# Patient Record
Sex: FEMALE | Race: WHITE | NOT HISPANIC OR LATINO | Employment: OTHER | URBAN - METROPOLITAN AREA
[De-identification: names, ages, dates, MRNs, and addresses within clinical notes are randomized per-mention and may not be internally consistent; named-entity substitution may affect disease eponyms.]

---

## 2017-01-01 ENCOUNTER — OFFICE VISIT (OUTPATIENT)
Dept: ONCOLOGY | Facility: CLINIC | Age: 82
End: 2017-01-01

## 2017-01-01 ENCOUNTER — LAB (OUTPATIENT)
Dept: LAB | Facility: HOSPITAL | Age: 82
End: 2017-01-01

## 2017-01-01 ENCOUNTER — TELEPHONE (OUTPATIENT)
Dept: PULMONOLOGY | Facility: CLINIC | Age: 82
End: 2017-01-01

## 2017-01-01 ENCOUNTER — TELEPHONE (OUTPATIENT)
Dept: ONCOLOGY | Facility: CLINIC | Age: 82
End: 2017-01-01

## 2017-01-01 VITALS
BODY MASS INDEX: 27.9 KG/M2 | RESPIRATION RATE: 16 BRPM | WEIGHT: 138.4 LBS | OXYGEN SATURATION: 90 % | DIASTOLIC BLOOD PRESSURE: 76 MMHG | SYSTOLIC BLOOD PRESSURE: 159 MMHG | HEART RATE: 75 BPM | HEIGHT: 59 IN | TEMPERATURE: 97.6 F

## 2017-01-01 DIAGNOSIS — C82.03 FOLLICULAR LYMPHOMA GRADE I OF INTRA-ABDOMINAL LYMPH NODES (HCC): ICD-10-CM

## 2017-01-01 DIAGNOSIS — C82.04 GRADE 1 FOLLICULAR LYMPHOMA OF LYMPH NODES OF AXILLA (HCC): Primary | ICD-10-CM

## 2017-01-01 DIAGNOSIS — Z85.51 HISTORY OF BLADDER CANCER: ICD-10-CM

## 2017-01-01 LAB
ALBUMIN SERPL-MCNC: 4.1 G/DL (ref 3.2–4.8)
ALBUMIN/GLOB SERPL: 1.3 G/DL (ref 1.5–2.5)
ALP SERPL-CCNC: 105 U/L (ref 25–100)
ALT SERPL W P-5'-P-CCNC: 9 U/L (ref 7–40)
ANION GAP SERPL CALCULATED.3IONS-SCNC: 6 MMOL/L (ref 3–11)
AST SERPL-CCNC: 24 U/L (ref 0–33)
BILIRUB SERPL-MCNC: 0.5 MG/DL (ref 0.3–1.2)
BUN BLD-MCNC: 15 MG/DL (ref 9–23)
BUN/CREAT SERPL: 16.7 (ref 7–25)
CALCIUM SPEC-SCNC: 9.4 MG/DL (ref 8.7–10.4)
CHLORIDE SERPL-SCNC: 105 MMOL/L (ref 99–109)
CO2 SERPL-SCNC: 30 MMOL/L (ref 20–31)
CREAT BLD-MCNC: 0.9 MG/DL (ref 0.6–1.3)
ERYTHROCYTE [DISTWIDTH] IN BLOOD BY AUTOMATED COUNT: 16.8 % (ref 11.3–14.5)
GFR SERPL CREATININE-BSD FRML MDRD: 60 ML/MIN/1.73
GLOBULIN UR ELPH-MCNC: 3.2 GM/DL
GLUCOSE BLD-MCNC: 93 MG/DL (ref 70–100)
HCT VFR BLD AUTO: 34.7 % (ref 34.5–44)
HGB BLD-MCNC: 10.9 G/DL (ref 11.5–15.5)
LYMPHOCYTES # BLD AUTO: 3 10*3/MM3 (ref 0.6–4.8)
LYMPHOCYTES NFR BLD AUTO: 37.9 % (ref 24–44)
MCH RBC QN AUTO: 26.9 PG (ref 27–31)
MCHC RBC AUTO-ENTMCNC: 31.5 G/DL (ref 32–36)
MCV RBC AUTO: 85.4 FL (ref 80–99)
MONOCYTES # BLD AUTO: 0.3 10*3/MM3 (ref 0–1)
MONOCYTES NFR BLD AUTO: 3.6 % (ref 0–12)
NEUTROPHILS # BLD AUTO: 4.7 10*3/MM3 (ref 1.5–8.3)
NEUTROPHILS NFR BLD AUTO: 58.5 % (ref 41–71)
PLATELET # BLD AUTO: 181 10*3/MM3 (ref 150–450)
PMV BLD AUTO: 8.4 FL (ref 6–12)
POTASSIUM BLD-SCNC: 5.3 MMOL/L (ref 3.5–5.5)
PROT SERPL-MCNC: 7.3 G/DL (ref 5.7–8.2)
RBC # BLD AUTO: 4.07 10*6/MM3 (ref 3.89–5.14)
SODIUM BLD-SCNC: 141 MMOL/L (ref 132–146)
WBC NRBC COR # BLD: 8 10*3/MM3 (ref 3.5–10.8)

## 2017-01-01 PROCEDURE — 85025 COMPLETE CBC W/AUTO DIFF WBC: CPT

## 2017-01-01 PROCEDURE — 99213 OFFICE O/P EST LOW 20 MIN: CPT | Performed by: INTERNAL MEDICINE

## 2017-01-01 PROCEDURE — 80053 COMPREHEN METABOLIC PANEL: CPT | Performed by: INTERNAL MEDICINE

## 2017-01-01 PROCEDURE — 36415 COLL VENOUS BLD VENIPUNCTURE: CPT

## 2017-01-01 RX ORDER — PREDNISONE 20 MG/1
40 TABLET ORAL DAILY
Qty: 10 TABLET | Refills: 0 | Status: SHIPPED | OUTPATIENT
Start: 2017-01-01 | End: 2018-01-01

## 2017-01-01 RX ORDER — AZITHROMYCIN 250 MG/1
TABLET, FILM COATED ORAL
Qty: 6 TABLET | Refills: 0 | Status: SHIPPED | OUTPATIENT
Start: 2017-01-01 | End: 2017-01-01

## 2017-01-01 RX ORDER — DOXYCYCLINE HYCLATE 100 MG/1
100 CAPSULE ORAL 2 TIMES DAILY
Qty: 20 CAPSULE | Refills: 0 | Status: SHIPPED | OUTPATIENT
Start: 2017-01-01 | End: 2018-01-01

## 2017-04-05 ENCOUNTER — OFFICE VISIT (OUTPATIENT)
Dept: ONCOLOGY | Facility: CLINIC | Age: 82
End: 2017-04-05

## 2017-04-05 ENCOUNTER — LAB (OUTPATIENT)
Dept: LAB | Facility: HOSPITAL | Age: 82
End: 2017-04-05

## 2017-04-05 ENCOUNTER — OFFICE VISIT (OUTPATIENT)
Dept: PULMONOLOGY | Facility: CLINIC | Age: 82
End: 2017-04-05

## 2017-04-05 VITALS
WEIGHT: 147 LBS | BODY MASS INDEX: 29.64 KG/M2 | OXYGEN SATURATION: 93 % | RESPIRATION RATE: 20 BRPM | SYSTOLIC BLOOD PRESSURE: 106 MMHG | HEART RATE: 90 BPM | TEMPERATURE: 97.6 F | DIASTOLIC BLOOD PRESSURE: 52 MMHG | HEIGHT: 59 IN

## 2017-04-05 DIAGNOSIS — R06.02 SHORTNESS OF BREATH: Primary | ICD-10-CM

## 2017-04-05 DIAGNOSIS — C82.03 FOLLICULAR LYMPHOMA GRADE I OF INTRA-ABDOMINAL LYMPH NODES (HCC): Primary | ICD-10-CM

## 2017-04-05 DIAGNOSIS — C82.03 FOLLICULAR LYMPHOMA GRADE I OF INTRA-ABDOMINAL LYMPH NODES (HCC): ICD-10-CM

## 2017-04-05 LAB
ALBUMIN SERPL-MCNC: 3.9 G/DL (ref 3.2–4.8)
ALBUMIN/GLOB SERPL: 1.3 G/DL (ref 1.5–2.5)
ALP SERPL-CCNC: 100 U/L (ref 25–100)
ALT SERPL W P-5'-P-CCNC: 7 U/L (ref 7–40)
ANION GAP SERPL CALCULATED.3IONS-SCNC: 9 MMOL/L (ref 3–11)
AST SERPL-CCNC: 23 U/L (ref 0–33)
BILIRUB SERPL-MCNC: 0.6 MG/DL (ref 0.3–1.2)
BUN BLD-MCNC: 19 MG/DL (ref 9–23)
BUN/CREAT SERPL: 17.3 (ref 7–25)
CALCIUM SPEC-SCNC: 9.2 MG/DL (ref 8.7–10.4)
CHLORIDE SERPL-SCNC: 99 MMOL/L (ref 99–109)
CO2 SERPL-SCNC: 30 MMOL/L (ref 20–31)
CREAT BLD-MCNC: 1.1 MG/DL (ref 0.6–1.3)
ERYTHROCYTE [DISTWIDTH] IN BLOOD BY AUTOMATED COUNT: 15.4 % (ref 11.3–14.5)
GFR SERPL CREATININE-BSD FRML MDRD: 47 ML/MIN/1.73
GLOBULIN UR ELPH-MCNC: 3.1 GM/DL
GLUCOSE BLD-MCNC: 140 MG/DL (ref 70–100)
HCT VFR BLD AUTO: 32.2 % (ref 34.5–44)
HGB BLD-MCNC: 10.4 G/DL (ref 11.5–15.5)
LYMPHOCYTES # BLD AUTO: 2.7 10*3/MM3 (ref 0.6–4.8)
LYMPHOCYTES NFR BLD AUTO: 35.4 % (ref 24–44)
MCH RBC QN AUTO: 27.7 PG (ref 27–31)
MCHC RBC AUTO-ENTMCNC: 32.4 G/DL (ref 32–36)
MCV RBC AUTO: 85.6 FL (ref 80–99)
MONOCYTES # BLD AUTO: 0.4 10*3/MM3 (ref 0–1)
MONOCYTES NFR BLD AUTO: 5.8 % (ref 0–12)
NEUTROPHILS # BLD AUTO: 4.5 10*3/MM3 (ref 1.5–8.3)
NEUTROPHILS NFR BLD AUTO: 58.8 % (ref 41–71)
PLATELET # BLD AUTO: 215 10*3/MM3 (ref 150–450)
PMV BLD AUTO: 8.7 FL (ref 6–12)
POTASSIUM BLD-SCNC: 4.2 MMOL/L (ref 3.5–5.5)
PROT SERPL-MCNC: 7 G/DL (ref 5.7–8.2)
RBC # BLD AUTO: 3.76 10*6/MM3 (ref 3.89–5.14)
SODIUM BLD-SCNC: 138 MMOL/L (ref 132–146)
WBC NRBC COR # BLD: 7.6 10*3/MM3 (ref 3.5–10.8)

## 2017-04-05 PROCEDURE — 99213 OFFICE O/P EST LOW 20 MIN: CPT | Performed by: INTERNAL MEDICINE

## 2017-04-05 PROCEDURE — 36415 COLL VENOUS BLD VENIPUNCTURE: CPT

## 2017-04-05 PROCEDURE — 85025 COMPLETE CBC W/AUTO DIFF WBC: CPT

## 2017-04-05 PROCEDURE — 94726 PLETHYSMOGRAPHY LUNG VOLUMES: CPT | Performed by: NURSE PRACTITIONER

## 2017-04-05 PROCEDURE — 94375 RESPIRATORY FLOW VOLUME LOOP: CPT | Performed by: NURSE PRACTITIONER

## 2017-04-05 PROCEDURE — 80053 COMPREHEN METABOLIC PANEL: CPT | Performed by: INTERNAL MEDICINE

## 2017-04-05 PROCEDURE — 94200 LUNG FUNCTION TEST (MBC/MVV): CPT | Performed by: NURSE PRACTITIONER

## 2017-04-05 PROCEDURE — 94729 DIFFUSING CAPACITY: CPT | Performed by: NURSE PRACTITIONER

## 2017-04-05 RX ORDER — MONTELUKAST SODIUM 10 MG/1
10 TABLET ORAL NIGHTLY
Qty: 30 TABLET | Refills: 3 | Status: SHIPPED | OUTPATIENT
Start: 2017-04-05

## 2017-04-05 NOTE — PROGRESS NOTES
Chief Complaint   Follow-up low-grade lymphoma-no recent recurrence or progression for greater than 5 years, since treated with single agent Treanda.    PROBLEM LIST   Patient Active Problem List   Diagnosis   • Allergic rhinitis   • Asthma   • COPD (chronic obstructive pulmonary disease) with emphysema   • Coronary artery disease   • Dyslipidemia   • GERD (gastroesophageal reflux disease)   • Hearing loss   • Hypothyroidism   • Non Hodgkin's lymphoma   • Pulmonary fibrosis   • History of bladder cancer       HISTORY OF PRESENT ILLNESS:   This very pleasant 84-year-old lady who had known for greater than 20 years returns for routine follow-up.  She did have a somewhat eventful winter.  She didn't need to be hospitalized but she did develop pneumonia that was treated a couple of months ago.  She's per much back to baseline.  She uses oxygen pretty much continuously.  No recent fevers chills or sweats.  No new abdominal symptoms to speak of she continues to enjoy her wonderful and large family.    Past Medical History, Past Surgical History, Social History, Family History have been reviewed and are without significant changes except as mentioned.      Medications:      Current Outpatient Prescriptions:   •  ciclesonide (ALVESCO) 160 MCG/ACT inhaler, Alvesco 160 MCG/ACT Inhalation Aerosol Solution; Patient Sig: Alvesco 160 MCG/ACT Inhalation Aerosol Solution QD; 0; 23-Sep-2014; Active, Disp: , Rfl:   •  acetaminophen (TYLENOL) 325 MG tablet, Take  by mouth., Disp: , Rfl:   •  aspirin 81 MG chewable tablet, Chew Daily., Disp: , Rfl:   •  atenolol (TENORMIN) 25 MG tablet, Take  by mouth Daily., Disp: , Rfl:   •  atorvastatin (LIPITOR) 40 MG tablet, Take  by mouth Daily., Disp: , Rfl:   •  ezetimibe (ZETIA) 10 MG tablet, Take  by mouth Daily., Disp: , Rfl:   •  furosemide (LASIX) 40 MG tablet, Take  by mouth Daily., Disp: , Rfl:   •  levothyroxine (SYNTHROID, LEVOTHROID) 75 MCG tablet, Take  by mouth Daily., Disp: , Rfl:  "  •  Loratadine (CLARITIN) 10 MG capsule, Take  by mouth., Disp: , Rfl:   •  montelukast (SINGULAIR) 10 MG tablet, Take 1 tablet by mouth Every Night., Disp: 30 tablet, Rfl: 3  •  nitroglycerin (NITROSTAT) 0.4 MG SL tablet, Place  under the tongue., Disp: , Rfl:   •  O2 (OXYGEN), Oxygen; Patient Sig: Oxygen ; 0; 13-Apr-2016; Active, Disp: , Rfl:     ALLERGIES:    Allergies   Allergen Reactions   • Budesonide    • Formoterol    • Penicillins    • Sulfa Antibiotics        ROS:  Review of Systems   Constitutional: Negative for activity change and appetite change.        Age related fatigue present.  Takes a nap most days.   HENT: Negative for mouth sores, sinus pressure and voice change.    Eyes: Negative for visual disturbance.   Respiratory: Negative for shortness of breath.         Chronic dyspnea on exertion slowly progressive over the years   Cardiovascular: Negative for chest pain.   Gastrointestinal: Negative for abdominal pain and vomiting.   Genitourinary: Negative for dysuria.   Musculoskeletal: Negative for arthralgias and myalgias.   Skin: Negative for color change.   Neurological: Negative for dizziness, syncope and headaches.   Hematological: Negative for adenopathy.   Psychiatric/Behavioral: Negative for confusion, sleep disturbance and suicidal ideas. The patient is not nervous/anxious.            Objective:    /52  Pulse 90  Temp 97.6 °F (36.4 °C) (Temporal Artery )   Resp 20  Ht 59\" (149.9 cm)  Wt 147 lb (66.7 kg)  SpO2 93% Comment: 2L o2  BMI 29.69 kg/m2    Physical Exam   Constitutional: She is oriented to person, place, and time. She appears well-developed and well-nourished. No distress.   Alert oriented petite 84-year-old lady in no acute distress   HENT:   Head: Normocephalic.   Mouth/Throat: Oropharynx is clear and moist.   No lymph nodes palpable in the head and neck or anywhere else in the body   Eyes: Conjunctivae and EOM are normal. No scleral icterus.   Neck: Normal range of " motion. Neck supple. No thyromegaly present.   Cardiovascular: Normal rate, regular rhythm and normal heart sounds.    No murmur heard.  Pulmonary/Chest: Effort normal and breath sounds normal. She has no wheezes. She has no rales.   Bilateral fine rales noted, consistent with fibrosis   Abdominal: Soft. Bowel sounds are normal. She exhibits no distension and no mass. There is no tenderness.   Can no longer feel the abdominal mass that she had for years that was a remnant of her prior low-grade lymphoma   Musculoskeletal: She exhibits no edema or tenderness.   Joint findings present in the fingers consistent with osteoarthritis   Lymphadenopathy:     She has no cervical adenopathy.   Neurological: She is alert and oriented to person, place, and time. She displays normal reflexes. No cranial nerve deficit.   Skin: Skin is warm and dry. No rash noted.   Psychiatric: She has a normal mood and affect. Judgment normal.   Vitals reviewed.             RECENT LABS:  Hematology WBC   Date Value Ref Range Status   10/19/2016 7.10 3.50 - 10.80 10*3/mm3 Final     Hemoglobin   Date Value Ref Range Status   10/19/2016 12.2 11.5 - 15.5 g/dL Final     Hematocrit   Date Value Ref Range Status   10/19/2016 37.6 34.5 - 44.0 % Final     MCV   Date Value Ref Range Status   10/19/2016 88.0 80.0 - 99.0 fL Final     RDW   Date Value Ref Range Status   10/19/2016 14.5 11.3 - 14.5 % Final     MPV   Date Value Ref Range Status   10/19/2016 8.3 6.0 - 12.0 fL Final     Platelets   Date Value Ref Range Status   10/19/2016 175 150 - 450 10*3/mm3 Final     Neutrophils, Absolute   Date Value Ref Range Status   10/19/2016 4.10 1.50 - 8.30 10*3/mm3 Final     Lymphocytes, Absolute   Date Value Ref Range Status   10/19/2016 2.80 0.60 - 4.80 10*3/mm3 Final     Monocytes, Absolute   Date Value Ref Range Status   10/19/2016 0.20 0.00 - 1.00 10*3/mm3 Final     Eosinophils Absolute   Date Value Ref Range Status   09/09/2015 0.29 0.10 - 0.30 K/mcL Final      Basophils Absolute   Date Value Ref Range Status   09/09/2015 0.04 0.00 - 0.20 K/mcL Final       Glucose   Date Value Ref Range Status   10/19/2016 128 (H) 70 - 100 mg/dL Final     Sodium   Date Value Ref Range Status   10/19/2016 144 132 - 146 mmol/L Final     Potassium   Date Value Ref Range Status   10/19/2016 4.2 3.5 - 5.5 mmol/L Final     CO2   Date Value Ref Range Status   10/19/2016 31.0 20.0 - 31.0 mmol/L Final     Chloride   Date Value Ref Range Status   10/19/2016 103 99 - 109 mmol/L Final     Anion Gap   Date Value Ref Range Status   10/19/2016 10.0 3.0 - 11.0 mmol/L Final     Creatinine   Date Value Ref Range Status   10/19/2016 1.00 0.60 - 1.30 mg/dL Final     BUN   Date Value Ref Range Status   10/19/2016 16 9 - 23 mg/dL Final     BUN/Creatinine Ratio   Date Value Ref Range Status   10/19/2016 16.0 7.0 - 25.0 Final     Calcium   Date Value Ref Range Status   10/19/2016 9.0 8.7 - 10.4 mg/dL Final     eGFR Non  Amer   Date Value Ref Range Status   10/19/2016 53 (L) >60 mL/min/1.73 Final     Alkaline Phosphatase   Date Value Ref Range Status   10/19/2016 112 (H) 25 - 100 U/L Final     Total Protein   Date Value Ref Range Status   10/19/2016 7.4 5.7 - 8.2 g/dL Final     ALT (SGPT)   Date Value Ref Range Status   10/19/2016 12 7 - 40 U/L Final     AST (SGOT)   Date Value Ref Range Status   10/19/2016 21 0 - 33 U/L Final     Total Bilirubin   Date Value Ref Range Status   10/19/2016 0.6 0.3 - 1.2 mg/dL Final     Albumin   Date Value Ref Range Status   10/19/2016 4.00 3.20 - 4.80 g/dL Final     Globulin   Date Value Ref Range Status   10/19/2016 3.4 gm/dL Final     A/G Ratio   Date Value Ref Range Status   10/19/2016 1.2 g/dL Final          Perf Status: 1    Assessment/Plan    Diagnoses and all orders for this visit:    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  I'm delighted for her.  Plan on seeing her back in about 6 months  or so as I usually do      Car Horton MD , 4/5/2017    CC:

## 2017-04-11 ENCOUNTER — OFFICE VISIT (OUTPATIENT)
Dept: PULMONOLOGY | Facility: CLINIC | Age: 82
End: 2017-04-11

## 2017-04-11 VITALS
WEIGHT: 144.8 LBS | DIASTOLIC BLOOD PRESSURE: 81 MMHG | HEART RATE: 74 BPM | OXYGEN SATURATION: 88 % | SYSTOLIC BLOOD PRESSURE: 141 MMHG | HEIGHT: 59 IN | TEMPERATURE: 97.1 F | RESPIRATION RATE: 18 BRPM | BODY MASS INDEX: 29.19 KG/M2

## 2017-04-11 DIAGNOSIS — J43.9 PULMONARY EMPHYSEMA, UNSPECIFIED EMPHYSEMA TYPE (HCC): ICD-10-CM

## 2017-04-11 DIAGNOSIS — C82.04 GRADE 1 FOLLICULAR LYMPHOMA OF LYMPH NODES OF AXILLA (HCC): ICD-10-CM

## 2017-04-11 DIAGNOSIS — R06.02 SOB (SHORTNESS OF BREATH): Primary | ICD-10-CM

## 2017-04-11 DIAGNOSIS — J84.10 PULMONARY FIBROSIS (HCC): ICD-10-CM

## 2017-04-11 DIAGNOSIS — I25.10 CORONARY ARTERY DISEASE WITHOUT ANGINA PECTORIS, UNSPECIFIED VESSEL OR LESION TYPE, UNSPECIFIED WHETHER NATIVE OR TRANSPLANTED HEART: ICD-10-CM

## 2017-04-11 DIAGNOSIS — K21.9 GASTROESOPHAGEAL REFLUX DISEASE, ESOPHAGITIS PRESENCE NOT SPECIFIED: ICD-10-CM

## 2017-04-11 LAB
ALBUMIN SERPL-MCNC: 4 G/DL (ref 3.2–4.8)
ALBUMIN/GLOB SERPL: 1.3 G/DL (ref 1.5–2.5)
ALP SERPL-CCNC: 103 U/L (ref 25–100)
ALT SERPL W P-5'-P-CCNC: 10 U/L (ref 7–40)
ANION GAP SERPL CALCULATED.3IONS-SCNC: 3 MMOL/L (ref 3–11)
AST SERPL-CCNC: 26 U/L (ref 0–33)
BASOPHILS # BLD AUTO: 0.03 10*3/MM3 (ref 0–0.2)
BASOPHILS NFR BLD AUTO: 0.4 % (ref 0–1)
BILIRUB SERPL-MCNC: 0.7 MG/DL (ref 0.3–1.2)
BNP SERPL-MCNC: 177 PG/ML (ref 0–100)
BUN BLD-MCNC: 13 MG/DL (ref 9–23)
BUN/CREAT SERPL: 14.4 (ref 7–25)
CALCIUM SPEC-SCNC: 9.4 MG/DL (ref 8.7–10.4)
CHLORIDE SERPL-SCNC: 105 MMOL/L (ref 99–109)
CO2 SERPL-SCNC: 31 MMOL/L (ref 20–31)
CREAT BLD-MCNC: 0.9 MG/DL (ref 0.6–1.3)
DEPRECATED RDW RBC AUTO: 47.4 FL (ref 37–54)
EOSINOPHIL # BLD AUTO: 0.26 10*3/MM3 (ref 0.1–0.3)
EOSINOPHIL NFR BLD AUTO: 3.6 % (ref 0–3)
ERYTHROCYTE [DISTWIDTH] IN BLOOD BY AUTOMATED COUNT: 14.3 % (ref 11.3–14.5)
GFR SERPL CREATININE-BSD FRML MDRD: 60 ML/MIN/1.73
GLOBULIN UR ELPH-MCNC: 3.2 GM/DL
GLUCOSE BLD-MCNC: 99 MG/DL (ref 70–100)
HCT VFR BLD AUTO: 35.5 % (ref 34.5–44)
HGB BLD-MCNC: 11.1 G/DL (ref 11.5–15.5)
IMM GRANULOCYTES # BLD: 0.02 10*3/MM3 (ref 0–0.03)
IMM GRANULOCYTES NFR BLD: 0.3 % (ref 0–0.6)
LYMPHOCYTES # BLD AUTO: 2.28 10*3/MM3 (ref 0.6–4.8)
LYMPHOCYTES NFR BLD AUTO: 31.7 % (ref 24–44)
MCH RBC QN AUTO: 28.2 PG (ref 27–31)
MCHC RBC AUTO-ENTMCNC: 31.3 G/DL (ref 32–36)
MCV RBC AUTO: 90.3 FL (ref 80–99)
MONOCYTES # BLD AUTO: 0.67 10*3/MM3 (ref 0–1)
MONOCYTES NFR BLD AUTO: 9.3 % (ref 0–12)
NEUTROPHILS # BLD AUTO: 3.93 10*3/MM3 (ref 1.5–8.3)
NEUTROPHILS NFR BLD AUTO: 54.7 % (ref 41–71)
PLATELET # BLD AUTO: 261 10*3/MM3 (ref 150–450)
PMV BLD AUTO: 10.5 FL (ref 6–12)
POTASSIUM BLD-SCNC: 4.4 MMOL/L (ref 3.5–5.5)
PROT SERPL-MCNC: 7.2 G/DL (ref 5.7–8.2)
RBC # BLD AUTO: 3.93 10*6/MM3 (ref 3.89–5.14)
SODIUM BLD-SCNC: 139 MMOL/L (ref 132–146)
WBC NRBC COR # BLD: 7.19 10*3/MM3 (ref 3.5–10.8)

## 2017-04-11 PROCEDURE — 85025 COMPLETE CBC W/AUTO DIFF WBC: CPT | Performed by: NURSE PRACTITIONER

## 2017-04-11 PROCEDURE — 80053 COMPREHEN METABOLIC PANEL: CPT | Performed by: NURSE PRACTITIONER

## 2017-04-11 PROCEDURE — 99214 OFFICE O/P EST MOD 30 MIN: CPT | Performed by: NURSE PRACTITIONER

## 2017-04-11 PROCEDURE — 83880 ASSAY OF NATRIURETIC PEPTIDE: CPT | Performed by: NURSE PRACTITIONER

## 2017-04-11 RX ORDER — MONTELUKAST SODIUM 10 MG/1
TABLET ORAL
Qty: 90 TABLET | Refills: 3 | Status: ON HOLD | OUTPATIENT
Start: 2017-04-11 | End: 2018-01-01

## 2017-10-04 NOTE — PROGRESS NOTES
Chief Complaint   Follow-up low-grade lymphoma.  History of bladder cancer.    PROBLEM LIST   Patient Active Problem List   Diagnosis   • Allergic rhinitis   • Asthma   • COPD (chronic obstructive pulmonary disease) with emphysema   • Coronary artery disease   • Dyslipidemia   • GERD (gastroesophageal reflux disease)   • Hearing loss   • Hypothyroidism   • Non Hodgkin's lymphoma   • Pulmonary fibrosis   • History of bladder cancer       HISTORY OF PRESENT ILLNESS:   Six-month follow-up for this now 85-year-old lady.  She had a good summer.  Her weight is been steady.  Her appetite is good.  She breathing comfortably.  She's had absolutely no abdominal pain.  She's had no dysuria or gross hematuria    Past Medical History, Past Surgical History, Social History, Family History have been reviewed and are without significant changes except as mentioned.      Medications:      Current Outpatient Prescriptions:   •  acetaminophen (TYLENOL) 325 MG tablet, Take  by mouth., Disp: , Rfl:   •  aspirin 81 MG chewable tablet, Chew Daily., Disp: , Rfl:   •  atenolol (TENORMIN) 25 MG tablet, Take  by mouth Daily., Disp: , Rfl:   •  atorvastatin (LIPITOR) 40 MG tablet, Take  by mouth Daily., Disp: , Rfl:   •  ciclesonide (ALVESCO) 160 MCG/ACT inhaler, Alvesco 160 MCG/ACT Inhalation Aerosol Solution; Patient Sig: Alvesco 160 MCG/ACT Inhalation Aerosol Solution QD; 0; 23-Sep-2014; Active, Disp: , Rfl:   •  ezetimibe (ZETIA) 10 MG tablet, Take  by mouth Daily., Disp: , Rfl:   •  levothyroxine (SYNTHROID, LEVOTHROID) 75 MCG tablet, Take  by mouth Daily., Disp: , Rfl:   •  Loratadine (CLARITIN) 10 MG capsule, Take  by mouth., Disp: , Rfl:   •  montelukast (SINGULAIR) 10 MG tablet, Take 1 tablet by mouth Every Night., Disp: 30 tablet, Rfl: 3  •  montelukast (SINGULAIR) 10 MG tablet, Take 1 each night, Disp: 90 tablet, Rfl: 3  •  nitroglycerin (NITROSTAT) 0.4 MG SL tablet, Place  under the tongue., Disp: , Rfl:   •  O2 (OXYGEN), Oxygen;  "Patient Sig: Oxygen ; 0; 13-Apr-2016; Active, Disp: , Rfl:   •  furosemide (LASIX) 40 MG tablet, Take  by mouth Daily., Disp: , Rfl:   •  predniSONE (DELTASONE) 20 MG tablet, Take 2 tablets by mouth Daily., Disp: 10 tablet, Rfl: 0    ALLERGIES:    Allergies   Allergen Reactions   • Budesonide    • Formoterol    • Penicillins    • Sulfa Antibiotics        ROS:  Review of Systems   Constitutional: Negative for activity change and appetite change.   HENT: Negative for mouth sores, sinus pressure and voice change.    Eyes: Negative for visual disturbance.   Respiratory: Negative for shortness of breath.         Chronic dyspnea and need for continuous oxygen unchanged   Cardiovascular: Negative for chest pain.   Gastrointestinal: Negative for abdominal pain and vomiting.   Genitourinary: Negative for dysuria.   Musculoskeletal: Negative for arthralgias and myalgias.        Osteoarthritic symptoms at baseline   Skin: Negative for color change.   Neurological: Negative for dizziness, syncope and headaches.   Hematological: Negative for adenopathy.   Psychiatric/Behavioral: Negative for confusion, sleep disturbance and suicidal ideas. The patient is not nervous/anxious.          Objective:    /76  Pulse 75  Temp 97.6 °F (36.4 °C) (Temporal Artery )   Resp 16  Ht 59\" (149.9 cm)  Wt 138 lb 6.4 oz (62.8 kg)  SpO2 90% Comment: 2L O2  BMI 27.95 kg/m2    Physical Exam   Constitutional: She is oriented to person, place, and time. She appears well-developed and well-nourished. No distress.   Smiles readily.  She is awake and alert.  She has nasal cannula oxygen applied.  She has excellent insight.   HENT:   Head: Normocephalic.   Mouth/Throat: Oropharynx is clear and moist.   Eyes: Conjunctivae and EOM are normal. No scleral icterus.   Neck: Normal range of motion. Neck supple. No thyromegaly present.   Cardiovascular: Normal rate, regular rhythm and normal heart sounds.    No murmur heard.  Pulmonary/Chest: Effort " normal and breath sounds normal. She has no wheezes. She has no rales.   Breath sounds decreased throughout   Abdominal: Soft. Bowel sounds are normal. She exhibits no distension and no mass. There is no tenderness.   Abdomen is nontender nondistended.  Bowel sounds are normal.  No organomegaly is noted   Musculoskeletal: She exhibits no edema or tenderness.   Lymphadenopathy:     She has no cervical adenopathy.   Neurological: She is alert and oriented to person, place, and time. She displays normal reflexes. No cranial nerve deficit.   Skin: Skin is warm and dry. No rash noted.   Psychiatric: She has a normal mood and affect. Judgment normal.   Vitals reviewed.             RECENT LABS:  Hematology WBC   Date Value Ref Range Status   10/04/2017 8.00 3.50 - 10.80 10*3/mm3 Final     Hemoglobin   Date Value Ref Range Status   10/04/2017 10.9 (L) 11.5 - 15.5 g/dL Final     Hematocrit   Date Value Ref Range Status   10/04/2017 34.7 34.5 - 44.0 % Final     MCV   Date Value Ref Range Status   10/04/2017 85.4 80.0 - 99.0 fL Final     RDW   Date Value Ref Range Status   10/04/2017 16.8 (H) 11.3 - 14.5 % Final     MPV   Date Value Ref Range Status   10/04/2017 8.4 6.0 - 12.0 fL Final     Platelets   Date Value Ref Range Status   10/04/2017 181 150 - 450 10*3/mm3 Final     Immature Grans %   Date Value Ref Range Status   04/11/2017 0.3 0.0 - 0.6 % Final     Neutrophils, Absolute   Date Value Ref Range Status   10/04/2017 4.70 1.50 - 8.30 10*3/mm3 Final     Lymphocytes, Absolute   Date Value Ref Range Status   10/04/2017 3.00 0.60 - 4.80 10*3/mm3 Final     Monocytes, Absolute   Date Value Ref Range Status   10/04/2017 0.30 0.00 - 1.00 10*3/mm3 Final     Eosinophils, Absolute   Date Value Ref Range Status   04/11/2017 0.26 0.10 - 0.30 10*3/mm3 Final     Basophils, Absolute   Date Value Ref Range Status   04/11/2017 0.03 0.00 - 0.20 10*3/mm3 Final     Immature Grans, Absolute   Date Value Ref Range Status   04/11/2017 0.02 0.00  - 0.03 10*3/mm3 Final       Glucose   Date Value Ref Range Status   04/11/2017 99 70 - 100 mg/dL Final     Sodium   Date Value Ref Range Status   04/11/2017 139 132 - 146 mmol/L Final     Potassium   Date Value Ref Range Status   04/11/2017 4.4 3.5 - 5.5 mmol/L Final     CO2   Date Value Ref Range Status   04/11/2017 31.0 20.0 - 31.0 mmol/L Final     Chloride   Date Value Ref Range Status   04/11/2017 105 99 - 109 mmol/L Final     Anion Gap   Date Value Ref Range Status   04/11/2017 3.0 3.0 - 11.0 mmol/L Final     Creatinine   Date Value Ref Range Status   04/11/2017 0.90 0.60 - 1.30 mg/dL Final     BUN   Date Value Ref Range Status   04/11/2017 13 9 - 23 mg/dL Final     BUN/Creatinine Ratio   Date Value Ref Range Status   04/11/2017 14.4 7.0 - 25.0 Final     Calcium   Date Value Ref Range Status   04/11/2017 9.4 8.7 - 10.4 mg/dL Final     eGFR Non  Amer   Date Value Ref Range Status   04/11/2017 60 (L) >60 mL/min/1.73 Final     Alkaline Phosphatase   Date Value Ref Range Status   04/11/2017 103 (H) 25 - 100 U/L Final     Total Protein   Date Value Ref Range Status   04/11/2017 7.2 5.7 - 8.2 g/dL Final     ALT (SGPT)   Date Value Ref Range Status   04/11/2017 10 7 - 40 U/L Final     AST (SGOT)   Date Value Ref Range Status   04/11/2017 26 0 - 33 U/L Final     Total Bilirubin   Date Value Ref Range Status   04/11/2017 0.7 0.3 - 1.2 mg/dL Final     Albumin   Date Value Ref Range Status   04/11/2017 4.00 3.20 - 4.80 g/dL Final     Globulin   Date Value Ref Range Status   04/11/2017 3.2 gm/dL Final     A/G Ratio   Date Value Ref Range Status   04/11/2017 1.3 (L) 1.5 - 2.5 g/dL Final          Perf Status: 1    Assessment/Plan    Diagnoses and all orders for this visit:    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  She has no evidence of recurrence of her bladder cancer or for that matter her non-Hodgkin's lymphoma.  I'll plan on seeing her back in  6 months as usual      Car Horton MD , 10/4/2017    CC:

## 2017-10-09 NOTE — TELEPHONE ENCOUNTER
Ms Santos complains of cough and increased congestion as well as some shortness of breath. She denies fever or chest pain. She requests an antibiotic as she is concerned about developing pneumonia. She has not had any antibiotic use since May. Doxycycline prescribed. She will follow-upas scheduled with [facility] on 10/20.

## 2018-01-01 ENCOUNTER — OFFICE VISIT (OUTPATIENT)
Dept: ONCOLOGY | Facility: CLINIC | Age: 83
End: 2018-01-01

## 2018-01-01 ENCOUNTER — LAB (OUTPATIENT)
Dept: LAB | Facility: HOSPITAL | Age: 83
End: 2018-01-01

## 2018-01-01 ENCOUNTER — APPOINTMENT (OUTPATIENT)
Dept: CT IMAGING | Facility: HOSPITAL | Age: 83
End: 2018-01-01

## 2018-01-01 ENCOUNTER — OUTSIDE FACILITY SERVICE (OUTPATIENT)
Dept: HOSPITALIST | Facility: HOSPITAL | Age: 83
End: 2018-01-01

## 2018-01-01 ENCOUNTER — HOSPITAL ENCOUNTER (INPATIENT)
Facility: HOSPITAL | Age: 83
LOS: 3 days | Discharge: HOME HEALTH | End: 2018-01-28
Attending: INTERNAL MEDICINE | Admitting: INTERNAL MEDICINE

## 2018-01-01 ENCOUNTER — TELEPHONE (OUTPATIENT)
Dept: PULMONOLOGY | Facility: CLINIC | Age: 83
End: 2018-01-01

## 2018-01-01 ENCOUNTER — OFFICE VISIT (OUTPATIENT)
Dept: PULMONOLOGY | Facility: CLINIC | Age: 83
End: 2018-01-01

## 2018-01-01 ENCOUNTER — TELEPHONE (OUTPATIENT)
Dept: ONCOLOGY | Facility: CLINIC | Age: 83
End: 2018-01-01

## 2018-01-01 ENCOUNTER — TRANSCRIBE ORDERS (OUTPATIENT)
Dept: PULMONOLOGY | Facility: CLINIC | Age: 83
End: 2018-01-01

## 2018-01-01 ENCOUNTER — APPOINTMENT (OUTPATIENT)
Dept: CARDIOLOGY | Facility: HOSPITAL | Age: 83
End: 2018-01-01

## 2018-01-01 ENCOUNTER — DOCUMENTATION (OUTPATIENT)
Dept: PULMONOLOGY | Facility: CLINIC | Age: 83
End: 2018-01-01

## 2018-01-01 ENCOUNTER — APPOINTMENT (OUTPATIENT)
Dept: MRI IMAGING | Facility: HOSPITAL | Age: 83
End: 2018-01-01

## 2018-01-01 ENCOUNTER — APPOINTMENT (OUTPATIENT)
Dept: NEUROLOGY | Facility: HOSPITAL | Age: 83
End: 2018-01-01
Attending: PSYCHIATRY & NEUROLOGY

## 2018-01-01 VITALS
TEMPERATURE: 97.3 F | BODY MASS INDEX: 26.81 KG/M2 | WEIGHT: 133 LBS | RESPIRATION RATE: 16 BRPM | SYSTOLIC BLOOD PRESSURE: 160 MMHG | HEART RATE: 86 BPM | HEIGHT: 59 IN | OXYGEN SATURATION: 90 % | DIASTOLIC BLOOD PRESSURE: 88 MMHG

## 2018-01-01 VITALS
HEART RATE: 83 BPM | DIASTOLIC BLOOD PRESSURE: 82 MMHG | TEMPERATURE: 98.2 F | OXYGEN SATURATION: 93 % | HEIGHT: 59 IN | RESPIRATION RATE: 22 BRPM | BODY MASS INDEX: 26.81 KG/M2 | SYSTOLIC BLOOD PRESSURE: 158 MMHG | WEIGHT: 133 LBS

## 2018-01-01 VITALS
RESPIRATION RATE: 28 BRPM | BODY MASS INDEX: 26.61 KG/M2 | WEIGHT: 132 LBS | DIASTOLIC BLOOD PRESSURE: 58 MMHG | SYSTOLIC BLOOD PRESSURE: 117 MMHG | HEIGHT: 59 IN | HEART RATE: 98 BPM | TEMPERATURE: 97.8 F | OXYGEN SATURATION: 81 %

## 2018-01-01 VITALS
DIASTOLIC BLOOD PRESSURE: 64 MMHG | TEMPERATURE: 97.9 F | HEART RATE: 78 BPM | WEIGHT: 129 LBS | HEIGHT: 59 IN | OXYGEN SATURATION: 93 % | BODY MASS INDEX: 26 KG/M2 | SYSTOLIC BLOOD PRESSURE: 116 MMHG | RESPIRATION RATE: 24 BRPM

## 2018-01-01 DIAGNOSIS — I63.311 CEREBROVASCULAR ACCIDENT (CVA) DUE TO THROMBOSIS OF RIGHT MIDDLE CEREBRAL ARTERY (HCC): ICD-10-CM

## 2018-01-01 DIAGNOSIS — J84.10 PULMONARY FIBROSIS (HCC): Primary | ICD-10-CM

## 2018-01-01 DIAGNOSIS — C82.00 FOLLICULAR LYMPHOMA GRADE I, UNSPECIFIED BODY REGION (HCC): ICD-10-CM

## 2018-01-01 DIAGNOSIS — C82.04 GRADE 1 FOLLICULAR LYMPHOMA OF LYMPH NODES OF AXILLA (HCC): ICD-10-CM

## 2018-01-01 DIAGNOSIS — J84.10 PULMONARY FIBROSIS (HCC): ICD-10-CM

## 2018-01-01 DIAGNOSIS — J30.89 CHRONIC ALLERGIC RHINITIS DUE TO OTHER ALLERGIC TRIGGER, UNSPECIFIED SEASONALITY: ICD-10-CM

## 2018-01-01 DIAGNOSIS — Z74.09 IMPAIRED FUNCTIONAL MOBILITY, BALANCE, GAIT, AND ENDURANCE: Primary | ICD-10-CM

## 2018-01-01 DIAGNOSIS — Z85.51 HISTORY OF BLADDER CANCER: ICD-10-CM

## 2018-01-01 DIAGNOSIS — K21.9 GASTROESOPHAGEAL REFLUX DISEASE, ESOPHAGITIS PRESENCE NOT SPECIFIED: ICD-10-CM

## 2018-01-01 DIAGNOSIS — C82.00 FOLLICULAR LYMPHOMA GRADE I, UNSPECIFIED BODY REGION (HCC): Primary | ICD-10-CM

## 2018-01-01 DIAGNOSIS — R09.02 HYPOXIA: ICD-10-CM

## 2018-01-01 DIAGNOSIS — R06.02 SOBOE (SHORTNESS OF BREATH ON EXERTION): ICD-10-CM

## 2018-01-01 DIAGNOSIS — R91.8 LUNG MASS: Primary | ICD-10-CM

## 2018-01-01 DIAGNOSIS — Z74.09 IMPAIRED MOBILITY AND ADLS: ICD-10-CM

## 2018-01-01 DIAGNOSIS — Z78.9 IMPAIRED MOBILITY AND ADLS: ICD-10-CM

## 2018-01-01 LAB
ALBUMIN SERPL-MCNC: 3.3 G/DL (ref 3.2–4.8)
ALBUMIN SERPL-MCNC: 3.4 G/DL (ref 3.2–4.8)
ALBUMIN SERPL-MCNC: 3.6 G/DL (ref 3.2–4.8)
ALBUMIN SERPL-MCNC: 3.6 G/DL (ref 3.2–4.8)
ALBUMIN SERPL-MCNC: 3.9 G/DL (ref 3.2–4.8)
ALBUMIN SERPL-MCNC: 4.2 G/DL (ref 3.2–4.8)
ALBUMIN/GLOB SERPL: 1.2 G/DL (ref 1.5–2.5)
ALBUMIN/GLOB SERPL: 1.3 G/DL (ref 1.5–2.5)
ALBUMIN/GLOB SERPL: 1.4 G/DL (ref 1.5–2.5)
ALP SERPL-CCNC: 111 U/L (ref 25–100)
ALP SERPL-CCNC: 76 U/L (ref 25–100)
ALP SERPL-CCNC: 77 U/L (ref 25–100)
ALP SERPL-CCNC: 77 U/L (ref 25–100)
ALP SERPL-CCNC: 93 U/L (ref 25–100)
ALP SERPL-CCNC: 97 U/L (ref 25–100)
ALT SERPL W P-5'-P-CCNC: 11 U/L (ref 7–40)
ALT SERPL W P-5'-P-CCNC: 12 U/L (ref 7–40)
ALT SERPL W P-5'-P-CCNC: 14 U/L (ref 7–40)
ALT SERPL W P-5'-P-CCNC: 15 U/L (ref 7–40)
ALT SERPL W P-5'-P-CCNC: 16 U/L (ref 7–40)
ALT SERPL W P-5'-P-CCNC: 17 U/L (ref 7–40)
ANION GAP SERPL CALCULATED.3IONS-SCNC: 10 MMOL/L (ref 3–11)
ANION GAP SERPL CALCULATED.3IONS-SCNC: 12 MMOL/L (ref 3–11)
ANION GAP SERPL CALCULATED.3IONS-SCNC: 7 MMOL/L (ref 3–11)
ANION GAP SERPL CALCULATED.3IONS-SCNC: 7 MMOL/L (ref 3–11)
ANION GAP SERPL CALCULATED.3IONS-SCNC: 8 MMOL/L (ref 3–11)
ANION GAP SERPL CALCULATED.3IONS-SCNC: 9 MMOL/L (ref 3–11)
ARTICHOKE IGE QN: 68 MG/DL (ref 0–130)
AST SERPL-CCNC: 21 U/L (ref 0–33)
AST SERPL-CCNC: 27 U/L (ref 0–33)
AST SERPL-CCNC: 28 U/L (ref 0–33)
AST SERPL-CCNC: 29 U/L (ref 0–33)
BASOPHILS # BLD AUTO: 0.03 10*3/MM3 (ref 0–0.2)
BASOPHILS # BLD AUTO: 0.04 10*3/MM3 (ref 0–0.2)
BASOPHILS # BLD MANUAL: 0.09 10*3/MM3 (ref 0–0.2)
BASOPHILS NFR BLD AUTO: 0.4 % (ref 0–1)
BASOPHILS NFR BLD AUTO: 0.5 % (ref 0–1)
BASOPHILS NFR BLD AUTO: 1 % (ref 0–1)
BH CV ECHO MEAS - AO ROOT AREA (BSA CORRECTED): 1.7
BH CV ECHO MEAS - AO ROOT AREA: 5.2 CM^2
BH CV ECHO MEAS - AO ROOT DIAM: 2.6 CM
BH CV ECHO MEAS - BSA(HAYCOCK): 1.6 M^2
BH CV ECHO MEAS - BSA: 1.6 M^2
BH CV ECHO MEAS - BZI_BMI: 26.9 KILOGRAMS/M^2
BH CV ECHO MEAS - BZI_METRIC_HEIGHT: 149.9 CM
BH CV ECHO MEAS - BZI_METRIC_WEIGHT: 60.3 KG
BH CV ECHO MEAS - EDV(CUBED): 49.1 ML
BH CV ECHO MEAS - EDV(TEICH): 56.7 ML
BH CV ECHO MEAS - EF(CUBED): 62.2 %
BH CV ECHO MEAS - EF(TEICH): 54.6 %
BH CV ECHO MEAS - ESV(CUBED): 18.5 ML
BH CV ECHO MEAS - ESV(TEICH): 25.7 ML
BH CV ECHO MEAS - FS: 27.7 %
BH CV ECHO MEAS - IVS/LVPW: 1
BH CV ECHO MEAS - IVSD: 0.94 CM
BH CV ECHO MEAS - LA DIMENSION: 3.8 CM
BH CV ECHO MEAS - LA/AO: 1.5
BH CV ECHO MEAS - LV MASS(C)D: 102.1 GRAMS
BH CV ECHO MEAS - LV MASS(C)DI: 65.9 GRAMS/M^2
BH CV ECHO MEAS - LVIDD: 3.7 CM
BH CV ECHO MEAS - LVIDS: 2.6 CM
BH CV ECHO MEAS - LVPWD: 0.94 CM
BH CV ECHO MEAS - MV A MAX VEL: 71.6 CM/SEC
BH CV ECHO MEAS - MV DEC SLOPE: 238 CM/SEC^2
BH CV ECHO MEAS - MV DEC TIME: 0.24 SEC
BH CV ECHO MEAS - MV E MAX VEL: 42 CM/SEC
BH CV ECHO MEAS - MV E/A: 0.59
BH CV ECHO MEAS - MV P1/2T MAX VEL: 61.9 CM/SEC
BH CV ECHO MEAS - MV P1/2T: 76.2 MSEC
BH CV ECHO MEAS - MVA P1/2T LCG: 3.6 CM^2
BH CV ECHO MEAS - MVA(P1/2T): 2.9 CM^2
BH CV ECHO MEAS - SI(CUBED): 19.7 ML/M^2
BH CV ECHO MEAS - SI(TEICH): 20 ML/M^2
BH CV ECHO MEAS - SV(CUBED): 30.6 ML
BH CV ECHO MEAS - SV(TEICH): 31 ML
BH CV ECHO MEAS - TAPSE (>1.6): 2.4 CM2
BH CV XLRA - RV BASE: 3.6 CM
BH CV XLRA - RV LENGTH: 6.4 CM
BH CV XLRA - RV MID: 2.5 CM
BH CV XLRA - TDI S': 8.88 CM/SEC
BH CV XLRA MEAS LEFT CCA RATIO VEL: 79.4 CM/SEC
BH CV XLRA MEAS LEFT DIST CCA EDV: 13.2 CM/SEC
BH CV XLRA MEAS LEFT DIST CCA PSV: 54 CM/SEC
BH CV XLRA MEAS LEFT DIST ICA EDV: 27 CM/SEC
BH CV XLRA MEAS LEFT DIST ICA PSV: 76.6 CM/SEC
BH CV XLRA MEAS LEFT ICA RATIO VEL: 70.6 CM/SEC
BH CV XLRA MEAS LEFT ICA/CCA RATIO: 0.89
BH CV XLRA MEAS LEFT MID CCA EDV: 20.4 CM/SEC
BH CV XLRA MEAS LEFT MID CCA PSV: 80.1 CM/SEC
BH CV XLRA MEAS LEFT MID ICA EDV: 27 CM/SEC
BH CV XLRA MEAS LEFT MID ICA PSV: 71.1 CM/SEC
BH CV XLRA MEAS LEFT PROX CCA EDV: 15.7 CM/SEC
BH CV XLRA MEAS LEFT PROX CCA PSV: 90.4 CM/SEC
BH CV XLRA MEAS LEFT PROX ECA PSV: 82.7 CM/SEC
BH CV XLRA MEAS LEFT PROX ICA EDV: 24.3 CM/SEC
BH CV XLRA MEAS LEFT PROX ICA PSV: 69.5 CM/SEC
BH CV XLRA MEAS LEFT PROX SCLA PSV: 129.6 CM/SEC
BH CV XLRA MEAS LEFT VERTEBRAL A PSV: 80.5 CM/SEC
BH CV XLRA MEAS RIGHT CCA RATIO VEL: 46.1 CM/SEC
BH CV XLRA MEAS RIGHT DIST CCA EDV: 8.3 CM/SEC
BH CV XLRA MEAS RIGHT DIST CCA PSV: 37.8 CM/SEC
BH CV XLRA MEAS RIGHT DIST ICA EDV: 19.2 CM/SEC
BH CV XLRA MEAS RIGHT DIST ICA PSV: 52.1 CM/SEC
BH CV XLRA MEAS RIGHT ICA RATIO VEL: 90.8 CM/SEC
BH CV XLRA MEAS RIGHT ICA/CCA RATIO: 2
BH CV XLRA MEAS RIGHT MID CCA EDV: 12.6 CM/SEC
BH CV XLRA MEAS RIGHT MID CCA PSV: 46.8 CM/SEC
BH CV XLRA MEAS RIGHT MID ICA EDV: 27 CM/SEC
BH CV XLRA MEAS RIGHT MID ICA PSV: 91.3 CM/SEC
BH CV XLRA MEAS RIGHT PROX CCA EDV: 16.7 CM/SEC
BH CV XLRA MEAS RIGHT PROX CCA PSV: 115.9 CM/SEC
BH CV XLRA MEAS RIGHT PROX ECA PSV: 67.3 CM/SEC
BH CV XLRA MEAS RIGHT PROX ICA EDV: 16.2 CM/SEC
BH CV XLRA MEAS RIGHT PROX ICA PSV: 47.6 CM/SEC
BH CV XLRA MEAS RIGHT PROX SCLA PSV: 155.2 CM/SEC
BH CV XLRA MEAS RIGHT VERTEBRAL A PSV: 68.8 CM/SEC
BILIRUB SERPL-MCNC: 0.4 MG/DL (ref 0.3–1.2)
BILIRUB SERPL-MCNC: 0.5 MG/DL (ref 0.3–1.2)
BILIRUB SERPL-MCNC: 0.5 MG/DL (ref 0.3–1.2)
BILIRUB SERPL-MCNC: 0.6 MG/DL (ref 0.3–1.2)
BILIRUB SERPL-MCNC: 0.6 MG/DL (ref 0.3–1.2)
BILIRUB SERPL-MCNC: 0.8 MG/DL (ref 0.3–1.2)
BNP SERPL-MCNC: 125 PG/ML (ref 0–100)
BUN BLD-MCNC: 11 MG/DL (ref 9–23)
BUN BLD-MCNC: 13 MG/DL (ref 9–23)
BUN BLD-MCNC: 17 MG/DL (ref 9–23)
BUN BLD-MCNC: 19 MG/DL (ref 9–23)
BUN BLD-MCNC: 20 MG/DL (ref 9–23)
BUN BLD-MCNC: 22 MG/DL (ref 9–23)
BUN/CREAT SERPL: 13.8 (ref 7–25)
BUN/CREAT SERPL: 16.3 (ref 7–25)
BUN/CREAT SERPL: 19 (ref 7–25)
BUN/CREAT SERPL: 21.3 (ref 7–25)
BUN/CREAT SERPL: 24.4 (ref 7–25)
BUN/CREAT SERPL: 25 (ref 7–25)
CALCIUM SPEC-SCNC: 8.1 MG/DL (ref 8.7–10.4)
CALCIUM SPEC-SCNC: 8.2 MG/DL (ref 8.7–10.4)
CALCIUM SPEC-SCNC: 8.3 MG/DL (ref 8.7–10.4)
CALCIUM SPEC-SCNC: 8.4 MG/DL (ref 8.7–10.4)
CALCIUM SPEC-SCNC: 8.7 MG/DL (ref 8.7–10.4)
CALCIUM SPEC-SCNC: 9.3 MG/DL (ref 8.7–10.4)
CHLORIDE SERPL-SCNC: 100 MMOL/L (ref 99–109)
CHLORIDE SERPL-SCNC: 101 MMOL/L (ref 99–109)
CHLORIDE SERPL-SCNC: 102 MMOL/L (ref 99–109)
CHLORIDE SERPL-SCNC: 103 MMOL/L (ref 99–109)
CHLORIDE SERPL-SCNC: 104 MMOL/L (ref 99–109)
CHLORIDE SERPL-SCNC: 105 MMOL/L (ref 99–109)
CHOLEST SERPL-MCNC: 127 MG/DL (ref 0–200)
CO2 SERPL-SCNC: 25 MMOL/L (ref 20–31)
CO2 SERPL-SCNC: 26 MMOL/L (ref 20–31)
CO2 SERPL-SCNC: 29 MMOL/L (ref 20–31)
CREAT BLD-MCNC: 0.8 MG/DL (ref 0.6–1.3)
CREAT BLD-MCNC: 0.9 MG/DL (ref 0.6–1.3)
CREAT BLD-MCNC: 1 MG/DL (ref 0.6–1.3)
DEPRECATED RDW RBC AUTO: 46.9 FL (ref 37–54)
DEPRECATED RDW RBC AUTO: 47.3 FL (ref 37–54)
DEPRECATED RDW RBC AUTO: 48.1 FL (ref 37–54)
DEPRECATED RDW RBC AUTO: 48.8 FL (ref 37–54)
DEPRECATED RDW RBC AUTO: 48.8 FL (ref 37–54)
EOSINOPHIL # BLD AUTO: 0.05 10*3/MM3 (ref 0–0.3)
EOSINOPHIL # BLD AUTO: 0.19 10*3/MM3 (ref 0–0.3)
EOSINOPHIL # BLD MANUAL: 0.36 10*3/MM3 (ref 0.1–0.3)
EOSINOPHIL NFR BLD AUTO: 0.6 % (ref 0–3)
EOSINOPHIL NFR BLD AUTO: 2.5 % (ref 0–3)
EOSINOPHIL NFR BLD MANUAL: 4 % (ref 0–3)
ERYTHROCYTE [DISTWIDTH] IN BLOOD BY AUTOMATED COUNT: 15 % (ref 11.3–14.5)
ERYTHROCYTE [DISTWIDTH] IN BLOOD BY AUTOMATED COUNT: 15 % (ref 11.3–14.5)
ERYTHROCYTE [DISTWIDTH] IN BLOOD BY AUTOMATED COUNT: 15.1 % (ref 11.3–14.5)
ERYTHROCYTE [DISTWIDTH] IN BLOOD BY AUTOMATED COUNT: 16.6 % (ref 11.3–14.5)
ERYTHROCYTE [DISTWIDTH] IN BLOOD BY AUTOMATED COUNT: 17.7 % (ref 11.3–14.5)
ERYTHROCYTE [SEDIMENTATION RATE] IN BLOOD: 46 MM/HR (ref 0–30)
GFR SERPL CREATININE-BSD FRML MDRD: 53 ML/MIN/1.73
GFR SERPL CREATININE-BSD FRML MDRD: 60 ML/MIN/1.73
GFR SERPL CREATININE-BSD FRML MDRD: 68 ML/MIN/1.73
GLOBULIN UR ELPH-MCNC: 2.6 GM/DL
GLOBULIN UR ELPH-MCNC: 2.6 GM/DL
GLOBULIN UR ELPH-MCNC: 2.8 GM/DL
GLOBULIN UR ELPH-MCNC: 2.9 GM/DL
GLOBULIN UR ELPH-MCNC: 3.3 GM/DL
GLOBULIN UR ELPH-MCNC: 3.4 GM/DL
GLUCOSE BLD-MCNC: 84 MG/DL (ref 70–100)
GLUCOSE BLD-MCNC: 84 MG/DL (ref 70–100)
GLUCOSE BLD-MCNC: 88 MG/DL (ref 70–100)
GLUCOSE BLD-MCNC: 91 MG/DL (ref 70–100)
GLUCOSE BLD-MCNC: 92 MG/DL (ref 70–100)
GLUCOSE BLD-MCNC: 96 MG/DL (ref 70–100)
GLUCOSE BLDC GLUCOMTR-MCNC: 96 MG/DL (ref 70–130)
HBA1C MFR BLD: 6.1 % (ref 4.8–5.6)
HCT VFR BLD AUTO: 31.9 % (ref 34.5–44)
HCT VFR BLD AUTO: 34.2 % (ref 34.5–44)
HCT VFR BLD AUTO: 35.4 % (ref 34.5–44)
HCT VFR BLD AUTO: 35.5 % (ref 34.5–44)
HCT VFR BLD AUTO: 36 % (ref 34.5–44)
HCT VFR BLD AUTO: 36 % (ref 34.5–44)
HCT VFR BLD AUTO: 36.3 % (ref 34.5–44)
HDLC SERPL-MCNC: 48 MG/DL (ref 40–60)
HGB BLD-MCNC: 10 G/DL (ref 11.5–15.5)
HGB BLD-MCNC: 10.7 G/DL (ref 11.5–15.5)
HGB BLD-MCNC: 11 G/DL (ref 11.5–15.5)
HGB BLD-MCNC: 11.1 G/DL (ref 11.5–15.5)
IMM GRANULOCYTES # BLD: 0.01 10*3/MM3 (ref 0–0.03)
IMM GRANULOCYTES # BLD: 0.01 10*3/MM3 (ref 0–0.03)
IMM GRANULOCYTES NFR BLD: 0.1 % (ref 0–0.6)
IMM GRANULOCYTES NFR BLD: 0.1 % (ref 0–0.6)
LEFT ATRIUM VOLUME INDEX: 18.1 ML/M2
LEFT ATRIUM VOLUME: 28 CM3
LYMPHOCYTES # BLD AUTO: 1.36 10*3/MM3 (ref 0.6–4.8)
LYMPHOCYTES # BLD AUTO: 1.8 10*3/MM3 (ref 0.6–4.8)
LYMPHOCYTES # BLD AUTO: 2.31 10*3/MM3 (ref 0.6–4.8)
LYMPHOCYTES # BLD AUTO: 2.6 10*3/MM3 (ref 0.6–4.8)
LYMPHOCYTES # BLD MANUAL: 1.98 10*3/MM3 (ref 0.6–4.8)
LYMPHOCYTES NFR BLD AUTO: 16.4 % (ref 24–44)
LYMPHOCYTES NFR BLD AUTO: 20.4 % (ref 24–44)
LYMPHOCYTES NFR BLD AUTO: 25.2 % (ref 24–44)
LYMPHOCYTES NFR BLD AUTO: 30.4 % (ref 24–44)
LYMPHOCYTES NFR BLD MANUAL: 22 % (ref 24–44)
LYMPHOCYTES NFR BLD MANUAL: 5 % (ref 0–12)
MAGNESIUM SERPL-MCNC: 1.7 MG/DL (ref 1.3–2.7)
MAGNESIUM SERPL-MCNC: 1.7 MG/DL (ref 1.3–2.7)
MAGNESIUM SERPL-MCNC: 2 MG/DL (ref 1.3–2.7)
MAXIMAL PREDICTED HEART RATE: 135 BPM
MCH RBC QN AUTO: 25.4 PG (ref 27–31)
MCH RBC QN AUTO: 25.4 PG (ref 27–31)
MCH RBC QN AUTO: 26.9 PG (ref 27–31)
MCH RBC QN AUTO: 27 PG (ref 27–31)
MCHC RBC AUTO-ENTMCNC: 30.4 G/DL (ref 32–36)
MCHC RBC AUTO-ENTMCNC: 30.6 G/DL (ref 32–36)
MCHC RBC AUTO-ENTMCNC: 30.6 G/DL (ref 32–36)
MCHC RBC AUTO-ENTMCNC: 31.1 G/DL (ref 32–36)
MCHC RBC AUTO-ENTMCNC: 31.2 G/DL (ref 32–36)
MCHC RBC AUTO-ENTMCNC: 31.3 G/DL (ref 32–36)
MCHC RBC AUTO-ENTMCNC: 31.3 G/DL (ref 32–36)
MCV RBC AUTO: 81.1 FL (ref 80–99)
MCV RBC AUTO: 83.6 FL (ref 80–99)
MCV RBC AUTO: 85.9 FL (ref 80–99)
MCV RBC AUTO: 86.2 FL (ref 80–99)
MCV RBC AUTO: 87 FL (ref 80–99)
MCV RBC AUTO: 88 FL (ref 80–99)
MCV RBC AUTO: 88 FL (ref 80–99)
METAMYELOCYTES NFR BLD MANUAL: 2 % (ref 0–0)
MONOCYTES # BLD AUTO: 0.45 10*3/MM3 (ref 0–1)
MONOCYTES # BLD AUTO: 0.6 10*3/MM3 (ref 0–1)
MONOCYTES # BLD AUTO: 0.61 10*3/MM3 (ref 0–1)
MONOCYTES # BLD AUTO: 0.68 10*3/MM3 (ref 0–1)
MONOCYTES # BLD AUTO: 0.7 10*3/MM3 (ref 0–1)
MONOCYTES NFR BLD AUTO: 5.3 % (ref 0–12)
MONOCYTES NFR BLD AUTO: 7.3 % (ref 0–12)
MONOCYTES NFR BLD AUTO: 7.5 % (ref 0–12)
MONOCYTES NFR BLD AUTO: 8.9 % (ref 0–12)
MYELOCYTES NFR BLD MANUAL: 2 % (ref 0–0)
NEUTROPHILS # BLD AUTO: 4.37 10*3/MM3 (ref 1.5–8.3)
NEUTROPHILS # BLD AUTO: 5.59 10*3/MM3 (ref 1.5–8.3)
NEUTROPHILS # BLD AUTO: 6.24 10*3/MM3 (ref 1.5–8.3)
NEUTROPHILS # BLD AUTO: 6.3 10*3/MM3 (ref 1.5–8.3)
NEUTROPHILS # BLD AUTO: 7.2 10*3/MM3 (ref 1.5–8.3)
NEUTROPHILS NFR BLD AUTO: 57.6 % (ref 41–71)
NEUTROPHILS NFR BLD AUTO: 69.5 % (ref 41–71)
NEUTROPHILS NFR BLD AUTO: 72.1 % (ref 41–71)
NEUTROPHILS NFR BLD AUTO: 75.2 % (ref 41–71)
NEUTROPHILS NFR BLD MANUAL: 61 % (ref 41–71)
NEUTS BAND NFR BLD MANUAL: 1 % (ref 0–5)
PHOSPHATE SERPL-MCNC: 3.2 MG/DL (ref 2.4–5.1)
PHOSPHATE SERPL-MCNC: 4.1 MG/DL (ref 2.4–5.1)
PLAT MORPH BLD: NORMAL
PLATELET # BLD AUTO: 186 10*3/MM3 (ref 150–450)
PLATELET # BLD AUTO: 192 10*3/MM3 (ref 150–450)
PLATELET # BLD AUTO: 192 10*3/MM3 (ref 150–450)
PLATELET # BLD AUTO: 210 10*3/MM3 (ref 150–450)
PLATELET # BLD AUTO: 229 10*3/MM3 (ref 150–450)
PLATELET # BLD AUTO: 233 10*3/MM3 (ref 150–450)
PLATELET # BLD AUTO: 283 10*3/MM3 (ref 150–450)
PMV BLD AUTO: 10.3 FL (ref 6–12)
PMV BLD AUTO: 10.8 FL (ref 6–12)
PMV BLD AUTO: 11 FL (ref 6–12)
PMV BLD AUTO: 11 FL (ref 6–12)
PMV BLD AUTO: 11.2 FL (ref 6–12)
PMV BLD AUTO: 8 FL (ref 6–12)
PMV BLD AUTO: 8.1 FL (ref 6–12)
POTASSIUM BLD-SCNC: 3.6 MMOL/L (ref 3.5–5.5)
POTASSIUM BLD-SCNC: 3.8 MMOL/L (ref 3.5–5.5)
POTASSIUM BLD-SCNC: 4 MMOL/L (ref 3.5–5.5)
POTASSIUM BLD-SCNC: 4 MMOL/L (ref 3.5–5.5)
POTASSIUM BLD-SCNC: 4.4 MMOL/L (ref 3.5–5.5)
POTASSIUM BLD-SCNC: 4.9 MMOL/L (ref 3.5–5.5)
PROT SERPL-MCNC: 5.9 G/DL (ref 5.7–8.2)
PROT SERPL-MCNC: 6.2 G/DL (ref 5.7–8.2)
PROT SERPL-MCNC: 6.2 G/DL (ref 5.7–8.2)
PROT SERPL-MCNC: 6.5 G/DL (ref 5.7–8.2)
PROT SERPL-MCNC: 7.2 G/DL (ref 5.7–8.2)
PROT SERPL-MCNC: 7.6 G/DL (ref 5.7–8.2)
RBC # BLD AUTO: 3.94 10*6/MM3 (ref 3.89–5.14)
RBC # BLD AUTO: 3.98 10*6/MM3 (ref 3.89–5.14)
RBC # BLD AUTO: 4.07 10*6/MM3 (ref 3.89–5.14)
RBC # BLD AUTO: 4.09 10*6/MM3 (ref 3.89–5.14)
RBC # BLD AUTO: 4.09 10*6/MM3 (ref 3.89–5.14)
RBC # BLD AUTO: 4.12 10*6/MM3 (ref 3.89–5.14)
RBC # BLD AUTO: 4.33 10*6/MM3 (ref 3.89–5.14)
RBC MORPH BLD: NORMAL
SODIUM BLD-SCNC: 136 MMOL/L (ref 132–146)
SODIUM BLD-SCNC: 136 MMOL/L (ref 132–146)
SODIUM BLD-SCNC: 137 MMOL/L (ref 132–146)
SODIUM BLD-SCNC: 139 MMOL/L (ref 132–146)
STRESS TARGET HR: 115 BPM
TRIGL SERPL-MCNC: 98 MG/DL (ref 0–150)
VARIANT LYMPHS NFR BLD MANUAL: 2 % (ref 0–5)
WBC MORPH BLD: NORMAL
WBC NRBC COR # BLD: 10.4 10*3/MM3 (ref 3.5–10.8)
WBC NRBC COR # BLD: 7.6 10*3/MM3 (ref 3.5–10.8)
WBC NRBC COR # BLD: 8 10*3/MM3 (ref 3.5–10.8)
WBC NRBC COR # BLD: 8.3 10*3/MM3 (ref 3.5–10.8)
WBC NRBC COR # BLD: 8.3 10*3/MM3 (ref 3.5–10.8)
WBC NRBC COR # BLD: 8.8 10*3/MM3 (ref 3.5–10.8)
WBC NRBC COR # BLD: 9.01 10*3/MM3 (ref 3.5–10.8)

## 2018-01-01 PROCEDURE — 99239 HOSP IP/OBS DSCHRG MGMT >30: CPT | Performed by: INTERNAL MEDICINE

## 2018-01-01 PROCEDURE — 36415 COLL VENOUS BLD VENIPUNCTURE: CPT

## 2018-01-01 PROCEDURE — 93306 TTE W/DOPPLER COMPLETE: CPT | Performed by: INTERNAL MEDICINE

## 2018-01-01 PROCEDURE — 99231 SBSQ HOSP IP/OBS SF/LOW 25: CPT | Performed by: PSYCHIATRY & NEUROLOGY

## 2018-01-01 PROCEDURE — 85027 COMPLETE CBC AUTOMATED: CPT | Performed by: NURSE PRACTITIONER

## 2018-01-01 PROCEDURE — 85652 RBC SED RATE AUTOMATED: CPT | Performed by: NURSE PRACTITIONER

## 2018-01-01 PROCEDURE — 83036 HEMOGLOBIN GLYCOSYLATED A1C: CPT | Performed by: NURSE PRACTITIONER

## 2018-01-01 PROCEDURE — 85007 BL SMEAR W/DIFF WBC COUNT: CPT | Performed by: INTERNAL MEDICINE

## 2018-01-01 PROCEDURE — 80053 COMPREHEN METABOLIC PANEL: CPT | Performed by: NURSE PRACTITIONER

## 2018-01-01 PROCEDURE — 99232 SBSQ HOSP IP/OBS MODERATE 35: CPT | Performed by: INTERNAL MEDICINE

## 2018-01-01 PROCEDURE — 85025 COMPLETE CBC W/AUTO DIFF WBC: CPT

## 2018-01-01 PROCEDURE — 93306 TTE W/DOPPLER COMPLETE: CPT

## 2018-01-01 PROCEDURE — 80053 COMPREHEN METABOLIC PANEL: CPT

## 2018-01-01 PROCEDURE — 80061 LIPID PANEL: CPT | Performed by: NURSE PRACTITIONER

## 2018-01-01 PROCEDURE — 95816 EEG AWAKE AND DROWSY: CPT

## 2018-01-01 PROCEDURE — 84100 ASSAY OF PHOSPHORUS: CPT | Performed by: INTERNAL MEDICINE

## 2018-01-01 PROCEDURE — 95816 EEG AWAKE AND DROWSY: CPT | Performed by: PSYCHIATRY & NEUROLOGY

## 2018-01-01 PROCEDURE — 99233 SBSQ HOSP IP/OBS HIGH 50: CPT | Performed by: INTERNAL MEDICINE

## 2018-01-01 PROCEDURE — 92523 SPEECH SOUND LANG COMPREHEN: CPT

## 2018-01-01 PROCEDURE — G0180 MD CERTIFICATION HHA PATIENT: HCPCS | Performed by: INTERNAL MEDICINE

## 2018-01-01 PROCEDURE — 70450 CT HEAD/BRAIN W/O DYE: CPT

## 2018-01-01 PROCEDURE — 93880 EXTRACRANIAL BILAT STUDY: CPT

## 2018-01-01 PROCEDURE — 83735 ASSAY OF MAGNESIUM: CPT | Performed by: INTERNAL MEDICINE

## 2018-01-01 PROCEDURE — 93880 EXTRACRANIAL BILAT STUDY: CPT | Performed by: INTERNAL MEDICINE

## 2018-01-01 PROCEDURE — 94618 PULMONARY STRESS TESTING: CPT | Performed by: NURSE PRACTITIONER

## 2018-01-01 PROCEDURE — 36415 COLL VENOUS BLD VENIPUNCTURE: CPT | Performed by: NURSE PRACTITIONER

## 2018-01-01 PROCEDURE — 97162 PT EVAL MOD COMPLEX 30 MIN: CPT

## 2018-01-01 PROCEDURE — 99213 OFFICE O/P EST LOW 20 MIN: CPT | Performed by: INTERNAL MEDICINE

## 2018-01-01 PROCEDURE — 63710000001 PREDNISONE PER 5 MG: Performed by: INTERNAL MEDICINE

## 2018-01-01 PROCEDURE — 99291 CRITICAL CARE FIRST HOUR: CPT | Performed by: INTERNAL MEDICINE

## 2018-01-01 PROCEDURE — 99215 OFFICE O/P EST HI 40 MIN: CPT | Performed by: NURSE PRACTITIONER

## 2018-01-01 PROCEDURE — 83880 ASSAY OF NATRIURETIC PEPTIDE: CPT | Performed by: NURSE PRACTITIONER

## 2018-01-01 PROCEDURE — 82962 GLUCOSE BLOOD TEST: CPT

## 2018-01-01 PROCEDURE — 85025 COMPLETE CBC W/AUTO DIFF WBC: CPT | Performed by: NURSE PRACTITIONER

## 2018-01-01 PROCEDURE — 70551 MRI BRAIN STEM W/O DYE: CPT

## 2018-01-01 PROCEDURE — 85025 COMPLETE CBC W/AUTO DIFF WBC: CPT | Performed by: INTERNAL MEDICINE

## 2018-01-01 PROCEDURE — 99223 1ST HOSP IP/OBS HIGH 75: CPT | Performed by: PSYCHIATRY & NEUROLOGY

## 2018-01-01 PROCEDURE — 99214 OFFICE O/P EST MOD 30 MIN: CPT | Performed by: INTERNAL MEDICINE

## 2018-01-01 PROCEDURE — 97165 OT EVAL LOW COMPLEX 30 MIN: CPT

## 2018-01-01 RX ORDER — CLOPIDOGREL BISULFATE 75 MG/1
75 TABLET ORAL DAILY
Status: DISCONTINUED | OUTPATIENT
Start: 2018-01-01 | End: 2018-01-01 | Stop reason: HOSPADM

## 2018-01-01 RX ORDER — LEVOTHYROXINE SODIUM 0.07 MG/1
75 TABLET ORAL
Status: DISCONTINUED | OUTPATIENT
Start: 2018-01-01 | End: 2018-01-01

## 2018-01-01 RX ORDER — ASPIRIN 325 MG
325 TABLET ORAL DAILY
Status: DISCONTINUED | OUTPATIENT
Start: 2018-01-01 | End: 2018-01-01

## 2018-01-01 RX ORDER — ATENOLOL 25 MG/1
25 TABLET ORAL DAILY
Status: DISCONTINUED | OUTPATIENT
Start: 2018-01-01 | End: 2018-01-01 | Stop reason: HOSPADM

## 2018-01-01 RX ORDER — ALPRAZOLAM 0.25 MG/1
0.25 TABLET ORAL 2 TIMES DAILY PRN
Qty: 60 TABLET | Refills: 5 | OUTPATIENT
Start: 2018-01-01

## 2018-01-01 RX ORDER — LEVOTHYROXINE SODIUM 0.05 MG/1
50 TABLET ORAL
Status: DISCONTINUED | OUTPATIENT
Start: 2018-01-01 | End: 2018-01-01 | Stop reason: HOSPADM

## 2018-01-01 RX ORDER — CETIRIZINE HYDROCHLORIDE 10 MG/1
5 TABLET ORAL DAILY
Status: DISCONTINUED | OUTPATIENT
Start: 2018-01-01 | End: 2018-01-01 | Stop reason: HOSPADM

## 2018-01-01 RX ORDER — LEVOTHYROXINE SODIUM 0.05 MG/1
50 TABLET ORAL
Status: DISCONTINUED | OUTPATIENT
Start: 2018-01-01 | End: 2018-01-01 | Stop reason: SDUPTHER

## 2018-01-01 RX ORDER — PREDNISONE 10 MG/1
5 TABLET ORAL
Status: DISCONTINUED | OUTPATIENT
Start: 2018-01-01 | End: 2018-01-01

## 2018-01-01 RX ORDER — ASPIRIN 81 MG/1
81 TABLET, CHEWABLE ORAL DAILY
Status: DISCONTINUED | OUTPATIENT
Start: 2018-01-01 | End: 2018-01-01 | Stop reason: HOSPADM

## 2018-01-01 RX ORDER — ACETAMINOPHEN 325 MG/1
650 TABLET ORAL EVERY 6 HOURS PRN
Status: DISCONTINUED | OUTPATIENT
Start: 2018-01-01 | End: 2018-01-01 | Stop reason: HOSPADM

## 2018-01-01 RX ORDER — SODIUM CHLORIDE 0.9 % (FLUSH) 0.9 %
1-10 SYRINGE (ML) INJECTION AS NEEDED
Status: DISCONTINUED | OUTPATIENT
Start: 2018-01-01 | End: 2018-01-01 | Stop reason: HOSPADM

## 2018-01-01 RX ORDER — ISOSORBIDE MONONITRATE 30 MG/1
30 TABLET, EXTENDED RELEASE ORAL DAILY
COMMUNITY
Start: 2017-01-01

## 2018-01-01 RX ORDER — ATORVASTATIN CALCIUM 40 MG/1
40 TABLET, FILM COATED ORAL NIGHTLY
Status: DISCONTINUED | OUTPATIENT
Start: 2018-01-01 | End: 2018-01-01 | Stop reason: HOSPADM

## 2018-01-01 RX ORDER — MORPHINE SULFATE 20 MG/ML
SOLUTION ORAL
Qty: 180 ML | Refills: 0 | Status: SHIPPED | OUTPATIENT
Start: 2018-01-01

## 2018-01-01 RX ORDER — MONTELUKAST SODIUM 10 MG/1
10 TABLET ORAL NIGHTLY
Status: DISCONTINUED | OUTPATIENT
Start: 2018-01-01 | End: 2018-01-01 | Stop reason: HOSPADM

## 2018-01-01 RX ORDER — LEVOTHYROXINE SODIUM 0.05 MG/1
50 TABLET ORAL DAILY
COMMUNITY

## 2018-01-01 RX ORDER — ACETAMINOPHEN 500 MG
500 TABLET ORAL DAILY PRN
COMMUNITY

## 2018-01-01 RX ORDER — ASPIRIN 300 MG/1
300 SUPPOSITORY RECTAL DAILY
Status: DISCONTINUED | OUTPATIENT
Start: 2018-01-01 | End: 2018-01-01

## 2018-01-01 RX ORDER — ATORVASTATIN CALCIUM 40 MG/1
80 TABLET, FILM COATED ORAL NIGHTLY
Status: DISCONTINUED | OUTPATIENT
Start: 2018-01-01 | End: 2018-01-01

## 2018-01-01 RX ORDER — CLOPIDOGREL BISULFATE 75 MG/1
75 TABLET ORAL DAILY
Qty: 30 TABLET | Refills: 1 | Status: SHIPPED | OUTPATIENT
Start: 2018-01-01

## 2018-01-01 RX ORDER — PREDNISONE 10 MG/1
TABLET ORAL
Qty: 30 TABLET | Refills: 1 | Status: SHIPPED | OUTPATIENT
Start: 2018-01-01 | End: 2018-01-01 | Stop reason: HOSPADM

## 2018-01-01 RX ORDER — CALCIUM CARBONATE 750 MG/1
750 TABLET, CHEWABLE ORAL DAILY PRN
COMMUNITY

## 2018-01-01 RX ORDER — ISOSORBIDE MONONITRATE 30 MG/1
30 TABLET, EXTENDED RELEASE ORAL DAILY
Status: DISCONTINUED | OUTPATIENT
Start: 2018-01-01 | End: 2018-01-01 | Stop reason: HOSPADM

## 2018-01-01 RX ADMIN — LEVOTHYROXINE SODIUM 75 MCG: 75 TABLET ORAL at 05:31

## 2018-01-01 RX ADMIN — ACETAMINOPHEN 650 MG: 325 TABLET, FILM COATED ORAL at 09:44

## 2018-01-01 RX ADMIN — CLOPIDOGREL BISULFATE 75 MG: 75 TABLET ORAL at 17:45

## 2018-01-01 RX ADMIN — MONTELUKAST SODIUM 10 MG: 10 TABLET, FILM COATED ORAL at 20:22

## 2018-01-01 RX ADMIN — MONTELUKAST SODIUM 10 MG: 10 TABLET, FILM COATED ORAL at 20:01

## 2018-01-01 RX ADMIN — ASPIRIN 81 MG 81 MG: 81 TABLET ORAL at 09:36

## 2018-01-01 RX ADMIN — CETIRIZINE HYDROCHLORIDE 5 MG: 10 TABLET, FILM COATED ORAL at 08:15

## 2018-01-01 RX ADMIN — ACETAMINOPHEN 650 MG: 325 TABLET, FILM COATED ORAL at 19:04

## 2018-01-01 RX ADMIN — ASPIRIN 81 MG 81 MG: 81 TABLET ORAL at 17:45

## 2018-01-01 RX ADMIN — ATORVASTATIN CALCIUM 40 MG: 40 TABLET, FILM COATED ORAL at 20:01

## 2018-01-01 RX ADMIN — ASPIRIN 81 MG 81 MG: 81 TABLET ORAL at 08:10

## 2018-01-01 RX ADMIN — ACETAMINOPHEN 325 MG: 325 TABLET, FILM COATED ORAL at 05:31

## 2018-01-01 RX ADMIN — CETIRIZINE HYDROCHLORIDE 5 MG: 10 TABLET, FILM COATED ORAL at 18:32

## 2018-01-01 RX ADMIN — MONTELUKAST SODIUM 10 MG: 10 TABLET, FILM COATED ORAL at 20:06

## 2018-01-01 RX ADMIN — ATORVASTATIN CALCIUM 80 MG: 40 TABLET, FILM COATED ORAL at 20:06

## 2018-01-01 RX ADMIN — CLOPIDOGREL BISULFATE 75 MG: 75 TABLET ORAL at 08:10

## 2018-01-01 RX ADMIN — LEVOTHYROXINE SODIUM 50 MCG: 50 TABLET ORAL at 06:08

## 2018-01-01 RX ADMIN — CLOPIDOGREL BISULFATE 75 MG: 75 TABLET ORAL at 09:37

## 2018-01-01 RX ADMIN — CETIRIZINE HYDROCHLORIDE 5 MG: 10 TABLET, FILM COATED ORAL at 09:37

## 2018-01-01 RX ADMIN — ATORVASTATIN CALCIUM 40 MG: 40 TABLET, FILM COATED ORAL at 20:22

## 2018-01-01 RX ADMIN — PREDNISONE 5 MG: 5 TABLET ORAL at 08:10

## 2018-01-01 RX ADMIN — ACETAMINOPHEN 650 MG: 325 TABLET, FILM COATED ORAL at 05:35

## 2018-01-01 RX ADMIN — ACETAMINOPHEN 650 MG: 325 TABLET, FILM COATED ORAL at 14:26

## 2018-01-01 RX ADMIN — ACETAMINOPHEN 325 MG: 325 TABLET, FILM COATED ORAL at 22:17

## 2018-01-01 RX ADMIN — LEVOTHYROXINE SODIUM 50 MCG: 50 TABLET ORAL at 05:35

## 2018-01-01 RX ADMIN — ACETAMINOPHEN 650 MG: 325 TABLET, FILM COATED ORAL at 16:51

## 2018-01-01 RX ADMIN — PREDNISONE 5 MG: 5 TABLET ORAL at 09:37

## 2018-01-01 RX ADMIN — CETIRIZINE HYDROCHLORIDE 5 MG: 10 TABLET, FILM COATED ORAL at 08:10

## 2018-01-15 NOTE — PROGRESS NOTES
[facility]monary Follow up    CHIEF COMPLAINT    Hypoxia  UIP  History of tobacco abuse  GERD    HISTORY OF PRESENT ILLNESS    Bee Santos is a 85 y.o.female here today for follow-up.  I last saw her in the office for/11/17.  She has a history of smoking a pack a day for over 40 years before she quit in 1997.  She also has a history of early interstitial pulmonary fibrosis discovered on CT the chest in 2013, initially presenting with a productive cough and mild shortness of breath.  She was started on Alvesco and her symptoms resolved.  She then had tried Spiriva but it caused heartburn, no the inhalers really made much of a difference since that time and she's not using anything.    For past couple years she's been on 2 L of oxygen with activity, and 2015 she desaturated from 94 down to 87 and needed 2 L.  With that however she stayed well above 90.    Today she is here complaining of increasing dyspnea, she doesn't think she is getting enough oxygen.  She's not coughing much for wheezing.  She's had no recent respiratory infections.  She's had no recent hospitalizations.  She has not been exposed to anybody that she knows of that is been sick.    Her most recent CT scan of the chest was a [place] in 2015, compared to 2013 there was really no interpretation as far as progression, impression noted per radiology findings consistent with initial lung disease, distribution likely representing an NSIP, interstitial thickening with peripheral and basilar predominance.  No significant honeycombing.  No nodular mass.  No adenopathy.  Moderate hiatal hernia.    She denies abdominal pain or dysphagia.  She denies much in way of nasal drainage.  No chest pains or edema, no palpitations.    She's had her blood drawn regularly as she thinks within the past few months with her oncologist Dr. Dominguez, he also does serial CT scans secondary to her history of non-Hodgkin's lymphoma.  Her weight is been stable and she follows  up with him she thinks in a few weeks.  She's had no fevers chills or night sweats.        Patient Active Problem List   Diagnosis   • Allergic rhinitis   • Asthma   • COPD (chronic obstructive pulmonary disease) with emphysema   • Coronary artery disease   • Dyslipidemia   • GERD (gastroesophageal reflux disease)   • Hearing loss   • Hypothyroidism   • Non Hodgkin's lymphoma   • Pulmonary fibrosis   • History of bladder cancer       Allergies   Allergen Reactions   • Budesonide    • Formoterol    • Penicillins    • Sulfa Antibiotics        Current Outpatient Prescriptions:   •  acetaminophen (TYLENOL) 325 MG tablet, Take  by mouth., Disp: , Rfl:   •  aspirin 81 MG chewable tablet, Chew Daily., Disp: , Rfl:   •  atenolol (TENORMIN) 25 MG tablet, Take  by mouth Daily., Disp: , Rfl:   •  atorvastatin (LIPITOR) 40 MG tablet, Take  by mouth Daily., Disp: , Rfl:   •  ciclesonide (ALVESCO) 160 MCG/ACT inhaler, Alvesco 160 MCG/ACT Inhalation Aerosol Solution; Patient Sig: Alvesco 160 MCG/ACT Inhalation Aerosol Solution QD; 0; 23-Sep-2014; Active, Disp: , Rfl:   •  ezetimibe (ZETIA) 10 MG tablet, Take  by mouth Daily., Disp: , Rfl:   •  furosemide (LASIX) 40 MG tablet, Take  by mouth Daily., Disp: , Rfl:   •  levothyroxine (SYNTHROID, LEVOTHROID) 75 MCG tablet, Take  by mouth Daily., Disp: , Rfl:   •  Loratadine (CLARITIN) 10 MG capsule, Take  by mouth., Disp: , Rfl:   •  montelukast (SINGULAIR) 10 MG tablet, Take 1 tablet by mouth Every Night., Disp: 30 tablet, Rfl: 3  •  montelukast (SINGULAIR) 10 MG tablet, Take 1 each night, Disp: 90 tablet, Rfl: 3  •  nitroglycerin (NITROSTAT) 0.4 MG SL tablet, Place  under the tongue., Disp: , Rfl:   •  O2 (OXYGEN), Oxygen; Patient Sig: Oxygen ; 0; 13-Apr-2016; Active, Disp: , Rfl:   •  predniSONE (DELTASONE) 10 MG tablet, Take 2 each am x 4 days; then stay on 1 a day, Disp: 30 tablet, Rfl: 1  MEDICATION LIST AND ALLERGIES REVIEWED.    Social History   Substance Use Topics   • Smoking  "status: Former Smoker     Packs/day: 1.00     Years: 40.00     Types: Cigarettes     Quit date: 1997   • Smokeless tobacco: Never Used   • Alcohol use No       FAMILY AND SOCIAL HISTORY REVIEWED.    Review of Systems   Constitutional: Negative for chills, fatigue, fever and unexpected weight change.   HENT: Positive for postnasal drip. Negative for nosebleeds and sore throat.    Eyes: Negative.  Negative for pain and redness.   Respiratory: Positive for shortness of breath. Negative for apnea, cough, choking, chest tightness, wheezing and stridor.    Cardiovascular: Negative for chest pain, palpitations and leg swelling.   Gastrointestinal: Negative for abdominal distention, abdominal pain and nausea.   Endocrine: Negative for cold intolerance and heat intolerance.   Genitourinary: Negative for dysuria, flank pain and hematuria.   Musculoskeletal: Positive for arthralgias, back pain and myalgias.   Skin: Negative for color change and rash.   Neurological: Negative for dizziness, syncope and numbness.   Hematological: Negative for adenopathy. Does not bruise/bleed easily.   Psychiatric/Behavioral: Negative for agitation, behavioral problems, confusion and sleep disturbance.   .    /88  Pulse 86  Temp 97.3 °F (36.3 °C)  Resp 16  Ht 149.9 cm (59\")  Wt 60.3 kg (133 lb)  SpO2 90% Comment: 2L  PF (!) 2 L/min  BMI 26.86 kg/m2  Physical Exam   Constitutional: She is oriented to person, place, and time. She appears well-developed. No distress.   HENT:   Head: Normocephalic.   Eyes: Pupils are equal, round, and reactive to light.   Neck: Normal range of motion. No JVD present. No thyromegaly present.   Cardiovascular: Normal rate, regular rhythm and normal heart sounds.  Exam reveals no gallop and no friction rub.    No murmur heard.  Mild BLE edema; no venous cords or calf tenderness.    Pulmonary/Chest: Effort normal. No stridor. No respiratory distress. She has no wheezes. She has rales. She exhibits no " tenderness.   Course Rales throughout, though not as much in the upper lobes, and not hurt much at all in the right upper lobe   Lymphadenopathy:     She has no cervical adenopathy.   Neurological: She is alert and oriented to person, place, and time.   Skin: She is not diaphoretic.   Psychiatric: She has a normal mood and affect. Her behavior is normal. Thought content normal.       RESULTS    Chest x-ray PA and lateral pain in the office today reveals probable chronic fibrotic changes; I don't have any films to compare.  No effusions, fibrotic changes do appear to be distributed more in the periphery, no markings are prominent in the right hilar region as well.    6 minute walk test she was 83% on room air.  At rest 3 L pulse dose O2 saturation was 93%.  Walking her on 5 L pulse dose a couple 100 feet O2 saturation remained above 90, however she desaturated to 77% as exercise progress, she stopped after about 150 feet, then resumed exercise at 5 L which kept O2 saturation 89% and then up to 97% at continual flow.  O2 saturation at rest at 5 L is 97%.  Part of this test I did is I watched her sitting at rest.        PROBLEM LIST    Problem List Items Addressed This Visit        Respiratory    Allergic rhinitis      Abnormal CT chest; ILD--With the appearance of UIP is mentioned in the 2015 CT chest per radiology     Overview     CT scan of the chest 02/23/2015 reports findings consistent with interstitial lung      disease, prior CT of 02/14/2013 reports probable early interstitial lung      disease/pulmonary fibrosis.               Relevant Orders       Digestive    GERD (gastroesophageal reflux disease)       Hematopoietic and Hemostatic    Non Hodgkin's lymphoma    Overview     Low-grade lymphoma, status post prior chemotherapy with Treanda.                 Other Visit Diagnoses     SOBOE (shortness of breath on exertion)          Hypoxia  --Formerly activity induced, now at rest, requiring 4-5 L instead of just  to that she required 3 years ago.              DISCUSSION    I not sure why she requires more oxygen at this time with the exception of the obvious; such as if her fibrotic process is progressing; or if she is anemic; no pleural effusions on chest x-ray..  Unless she is anemic etc., we'll draw blood today just to check CBC, CMP.    We reviewed her walk test and she can wear 4-5 L continuous oxygen with activity, in fact today 1 she walked out if she walks a couple 100 feet 5 L PulseDose kept O2 saturation above 90 but she had to make sure she was breathing through her nose.    At home she can wear oxygen 4 L each night, and she can wear 4-5 L with activity at home as well.  She said she feels a lot better with the 5 L that she was placed on at the end of her walk test.    She has had slight improvement with her symptoms her daughter thinks in the past on prednisone, with low-dose prednisone, we'll see if that helps her, 20 mg prednisone each morning for 4 days and then remain on 10 mg each day until I see her again.  They were told to call and given extension 104 if there is any trouble tolerating the prednisone, when she returns we'll do full pulmonary function testing.    I also wonder to have a repeat CT the chest to compare to the one from 2015, we discussed her x-ray today and though there are fibrotic changes I don't have anything to compare and I think what I'm seeing today is chronic though again I don't have any prior studies for direct comparison.  --CT the chest without contrast in [place] within this next week    She'll follow-up in 3 weeks on low dose prednisone; at 10 mg daily; and was told to call and given extensions 104 if needed for an earlier visit if problems arise prior to the scheduled followup.  Her daughter will probably call us in a couple days to check on the lab results    Discussed diagnostic testing, the importance of treatment compliance, we reviewed the written instructions that were  given regarding medication use.  We discussed risk factor reduction such as GERD precautions, early recognition of worsening symptoms, avoiding things that trigger symptoms including cigarette smoke, environmental irritants etc.; and also discussed possible future recommended diagnostic studies and the necessity of future follow-up.    Extended visit today, 40 minutes, 30 minutes spent face-to-face counseling, education, discussion and/or coordination of care as listed in mentioned above.    Maricruz Santamaria, APRN  01/15/061551:02 AM  Electronically signed     Please note that portions of this note were completed with a voice recognition program. Efforts were made to edit the dictations, but occasionally words are mistranscribed.      CC: Gracie Harmon MD

## 2018-01-23 NOTE — PROGRESS NOTES
Ms Santos's CT chest performed on 1/18/18 at [facility] revealed a new 2.9 cm left pulmonary nodule concerning for malignancy. Ms Santos was scheduled to return to office on 2/9/18 to see Maricruz Santamaria. I discussed this with Maricruz, who wanted Ms Santos to be seen in office sooner. Unfortunately, Ms Santos relies on her daughter for transportation to [place] and can only make it to appointments on Fridays. Ms Santos asked that I go ahead and give her the CT results, which I discussed with her. She'd like to proceed with a PET scan at [facility]. For now she'll keep her appointment with Maricurz on 2/9 but may be moved sooner if needed.

## 2018-01-25 PROBLEM — I63.9 CVA (CEREBRAL VASCULAR ACCIDENT) (HCC): Status: ACTIVE | Noted: 2018-01-01

## 2018-02-15 NOTE — TELEPHONE ENCOUNTER
PET results reviewed with Ms Santos's daughter, Nirmala. Left lung nodule suggestive of malignancy with maximum SUV of 11.4. Significant hypermetabolism seen in rectum as well and radiologist recommends colonoscopy.     Ms Santos is well established with Dr Horton and already has an appointment with him scheduled for April 4 but Nirmala is going to call to get that moved up. I let her know that I would also inform Allie Burnette, lung nurse navigator, to help facilitate a sooner appointment with oncology.

## 2018-02-19 NOTE — PROGRESS NOTES
I spoke to the patient's daughter Hermelinda Payne today regarding the patient's desire to try to use the pulse dose oxygen, we reviewed the fact that the PulseDose oxygen even with a few feet of ambulation only gave her an O2 saturation of 84%.  I encouraged her to try to continue to stay on the larger tanks when she is out of the house, as I worry is she would have worsening cardiac failure, smothering in dyspnea if she tried to get by with the portable tank which did not sustain O2 saturation even turning up to 5 at a pulse dose.    She verbalizes understanding, they follow up with Dr. Dominguez later this week for the positive PET scan

## 2018-02-21 NOTE — PROGRESS NOTES
Chief Complaint   Follow-up recent stroke, left lower lobe lung malignancy-presumed-see below.  Long-standing low-grade lymphoma.  End stage pulmonary fibrosis    PROBLEM LIST   Patient Active Problem List   Diagnosis   • Allergic rhinitis   • Asthma   • COPD (chronic obstructive pulmonary disease) with emphysema   • Coronary artery disease   • Dyslipidemia   • GERD (gastroesophageal reflux disease)   • Hearing loss   • Hypothyroidism   • Non Hodgkin's lymphoma   • Pulmonary fibrosis   • History of bladder cancer   • CVA (cerebral vascular accident)       HISTORY OF PRESENT ILLNESS:   I've known this very sweet 85-year-old lady for over 2 decades.  She was recently hospitalized here for a CVA which responded nicely to infusion of a clot Buster.  Unfortunately, both before this admission and subsequent to that she has had horrible dyspnea due to her severe pulmonary fibrosis.  She is on constant oxygen.  She can no longer walk to the mailbox are due much at all because of this.  She had a chest CT which showed a left lower lobe irregular mass which was suspicious for lung cancer.  PET/CT in follow-up showed what appeared to be an early stage but nonetheless hypermetabolic lesion in that left lower lobe consistent with a bronchogenic carcinoma.  She's had no hemoptysis.  She's not having any wheezing.    Because of our long-standing close relationship, the family is here to discuss what to do from here.  They have always been very realistic as has been the patient    Past Medical History, Past Surgical History, Social History, Family History have been reviewed and are without significant changes except as mentioned.      Medications:      Current Outpatient Prescriptions:   •  acetaminophen (TYLENOL) 500 MG tablet, Take 500 mg by mouth Daily As Needed for Mild Pain ., Disp: , Rfl:   •  aspirin 81 MG chewable tablet, Chew 81 mg Daily., Disp: , Rfl:   •  atenolol (TENORMIN) 25 MG tablet, Take 25 mg by mouth Daily.,  Disp: , Rfl:   •  atorvastatin (LIPITOR) 40 MG tablet, Take 40 mg by mouth Daily., Disp: , Rfl:   •  calcium carbonate EX (TUMS EX) 750 MG chewable tablet, Chew 750 mg Daily As Needed for Indigestion or Heartburn., Disp: , Rfl:   •  clopidogrel (PLAVIX) 75 MG tablet, Take 1 tablet by mouth Daily., Disp: 30 tablet, Rfl: 1  •  ezetimibe (ZETIA) 10 MG tablet, Take 10 mg by mouth Daily., Disp: , Rfl:   •  furosemide (LASIX) 40 MG tablet, Take 40 mg by mouth Daily As Needed., Disp: , Rfl:   •  isosorbide mononitrate (IMDUR) 30 MG 24 hr tablet, Take 30 mg by mouth Daily., Disp: , Rfl:   •  levothyroxine (SYNTHROID, LEVOTHROID) 50 MCG tablet, Take 50 mcg by mouth Daily., Disp: , Rfl:   •  Loratadine (CLARITIN) 10 MG capsule, Take 10 mg by mouth Every Morning., Disp: , Rfl:   •  montelukast (SINGULAIR) 10 MG tablet, Take 1 tablet by mouth Every Night., Disp: 30 tablet, Rfl: 3  •  nitroglycerin (NITROSTAT) 0.4 MG SL tablet, Place 0.4 mg under the tongue Every 5 (Five) Minutes As Needed for Chest Pain., Disp: , Rfl:     ALLERGIES:    Allergies   Allergen Reactions   • Budesonide    • Formoterol    • Penicillins    • Sulfa Antibiotics        ROS:  Review of Systems   Constitutional: Negative for activity change and appetite change.        Severe fatigue and shortness of breath.  Forcefully she is still eating.  She doesn't have much in the way of pain.   HENT: Negative for mouth sores, sinus pressure and voice change.    Eyes: Negative for visual disturbance.   Respiratory: Negative for shortness of breath.    Cardiovascular: Negative for chest pain.   Gastrointestinal: Negative for abdominal pain and vomiting.   Genitourinary: Negative for dysuria.   Musculoskeletal: Negative for arthralgias and myalgias.   Skin: Negative for color change.   Neurological: Negative for dizziness, syncope and headaches.   Hematological: Negative for adenopathy.   Psychiatric/Behavioral: Negative for confusion, sleep disturbance and suicidal  "ideas. The patient is not nervous/anxious.            Objective:    /58 Comment: LUE  Pulse 98  Temp 97.8 °F (36.6 °C) (Temporal Artery )   Resp 28  Ht 149.9 cm (59\")  Wt 59.9 kg (132 lb)  SpO2 (!) 81% Comment: 4L continuous  BMI 26.66 kg/m2    Physical Exam   Constitutional: She is oriented to person, place, and time. She appears well-developed and well-nourished. No distress.   She is alert and she is oriented.  She does not appear uncomfortable.  She just appears real real feeble.   HENT:   Head: Normocephalic.   Mouth/Throat: Oropharynx is clear and moist.   She has a lesion smack dab  on the front of her nose which is not currently bleeding.   Eyes: Conjunctivae and EOM are normal. No scleral icterus.   Neck: Normal range of motion. Neck supple. No thyromegaly present.   Cardiovascular: Normal rate, regular rhythm and normal heart sounds.    No murmur heard.  Pulmonary/Chest: Effort normal and breath sounds normal. She has no wheezes. She has no rales.   Breath sounds are greatly reduced throughout   Abdominal: Soft. Bowel sounds are normal. She exhibits no distension and no mass. There is no tenderness.   Musculoskeletal: She exhibits no edema or tenderness.   Lymphadenopathy:     She has no cervical adenopathy.   Neurological: She is alert and oriented to person, place, and time. She displays normal reflexes. No cranial nerve deficit.   Skin: Skin is warm and dry. No rash noted.   Psychiatric: She has a normal mood and affect. Judgment normal.   Vitals reviewed.             RECENT LABS:  Hematology WBC   Date Value Ref Range Status   02/21/2018 10.40 3.50 - 10.80 10*3/mm3 Final     Hemoglobin   Date Value Ref Range Status   02/21/2018 11.0 (L) 11.5 - 15.5 g/dL Final     Hematocrit   Date Value Ref Range Status   02/21/2018 36.3 34.5 - 44.0 % Final     MCV   Date Value Ref Range Status   02/21/2018 83.6 80.0 - 99.0 fL Final     RDW   Date Value Ref Range Status   02/21/2018 16.6 (H) 11.3 - 14.5 % " Final     MPV   Date Value Ref Range Status   02/21/2018 8.0 6.0 - 12.0 fL Final     Platelets   Date Value Ref Range Status   02/21/2018 283 150 - 450 10*3/mm3 Final     Immature Grans %   Date Value Ref Range Status   01/25/2018 0.1 0.0 - 0.6 % Final     Neutrophils, Absolute   Date Value Ref Range Status   02/21/2018 7.20 1.50 - 8.30 10*3/mm3 Final     Lymphocytes, Absolute   Date Value Ref Range Status   02/21/2018 2.60 0.60 - 4.80 10*3/mm3 Final     Monocytes, Absolute   Date Value Ref Range Status   02/21/2018 0.60 0.00 - 1.00 10*3/mm3 Final     Eosinophils, Absolute   Date Value Ref Range Status   01/25/2018 0.05 0.00 - 0.30 10*3/mm3 Final     Eosinophils Absolute   Date Value Ref Range Status   01/27/2018 0.36 (H) 0.10 - 0.30 10*3/mm3 Final     Basophils, Absolute   Date Value Ref Range Status   01/25/2018 0.03 0.00 - 0.20 10*3/mm3 Final     Basophils Absolute   Date Value Ref Range Status   01/27/2018 0.09 0.00 - 0.20 10*3/mm3 Final     Immature Grans, Absolute   Date Value Ref Range Status   01/25/2018 0.01 0.00 - 0.03 10*3/mm3 Final       Glucose   Date Value Ref Range Status   01/27/2018 84 70 - 100 mg/dL Final     Sodium   Date Value Ref Range Status   01/27/2018 139 132 - 146 mmol/L Final     Potassium   Date Value Ref Range Status   01/27/2018 4.0 3.5 - 5.5 mmol/L Final     CO2   Date Value Ref Range Status   01/27/2018 26.0 20.0 - 31.0 mmol/L Final     Chloride   Date Value Ref Range Status   01/27/2018 105 99 - 109 mmol/L Final     Anion Gap   Date Value Ref Range Status   01/27/2018 8.0 3.0 - 11.0 mmol/L Final     Creatinine   Date Value Ref Range Status   01/27/2018 0.80 0.60 - 1.30 mg/dL Final     BUN   Date Value Ref Range Status   01/27/2018 20 9 - 23 mg/dL Final     BUN/Creatinine Ratio   Date Value Ref Range Status   01/27/2018 25.0 7.0 - 25.0 Final     Calcium   Date Value Ref Range Status   01/27/2018 8.1 (L) 8.7 - 10.4 mg/dL Final     eGFR Non  Amer   Date Value Ref Range Status    01/27/2018 68 >60 mL/min/1.73 Final     Alkaline Phosphatase   Date Value Ref Range Status   01/27/2018 77 25 - 100 U/L Final     Total Protein   Date Value Ref Range Status   01/27/2018 5.9 5.7 - 8.2 g/dL Final     ALT (SGPT)   Date Value Ref Range Status   01/27/2018 17 7 - 40 U/L Final     AST (SGOT)   Date Value Ref Range Status   01/27/2018 21 0 - 33 U/L Final     Total Bilirubin   Date Value Ref Range Status   01/27/2018 0.5 0.3 - 1.2 mg/dL Final     Albumin   Date Value Ref Range Status   01/27/2018 3.30 3.20 - 4.80 g/dL Final     Globulin   Date Value Ref Range Status   01/27/2018 2.6 gm/dL Final     A/G Ratio   Date Value Ref Range Status   01/27/2018 1.3 (L) 1.5 - 2.5 g/dL Final          Perf Status:2    Assessment/Plan    Diagnoses and all orders for this visit:    Follicular lymphoma grade I, unspecified body region    Pulmonary fibrosis      This absolute delightful lady now has a bronchogenic carcinoma.  She is relatively asymptomatic from that.  Her symptoms are due to her progressive pulmonary fibrosis.    If she was in better health, I would recommend CyberKnife therapy to her primary lesion.  I do not think she will have time to benefit from this.    I'm going to get hospice involved.  I think she could benefit from a little bit of Xanax for anxiety from time to time and more importantly from some oral morphine which would really help at times for her shortness of breath.  We will contact hospice today.  I'll see the patient back in a month or 6 weeks or so.  All the above discussed of course with the patient and with her delmy family.  They certainly appear to strongly agree.      Car Horton MD , 2/21/2018    CC:

## 2018-02-22 NOTE — TELEPHONE ENCOUNTER
----- Message from Nicol Vo sent at 2/22/2018  8:51 AM EST -----  Regarding: MAR - LIFE EXPECTANCY  Contact: [phone]  PERLA HERNANDEZ, DAUGHTER WANTED TO KNOW IF MAR COULD GIVE A GUESS ABOUT HOW MUCH LONGER DOES HER MOM HAVE? SINCE HOSPICE WAS CALLED IN, SHE WAS JUST WANTING TO KNOW.     PLEASE CALL PERLA BACK

## 2018-02-22 NOTE — TELEPHONE ENCOUNTER
Per , he states he thinks 1-3 months.  Patient has a strong will but does not want to live this way.  There are a lot of things that could effect this.  Nirmala stated understanding.

## 2018-02-22 NOTE — TELEPHONE ENCOUNTER
----- Message from Ryanne Moon sent at 2/22/2018  9:01 AM EST -----  Regarding: [facility]HOSPICE  Contact: [phone]  Lor with hospice called and patient has been admitted to hospice.

## 2018-03-05 NOTE — TELEPHONE ENCOUNTER
----- Message from Ryanne Moon sent at 3/5/2018  3:01 PM EST -----  Regarding: [facility]-HOSPICE  Contact: [phone]  Briana with hospice called she has a terrible cough wants to know if she can get some Tessalon perles ordered for her? Please call.

## 2018-03-22 NOTE — PROGRESS NOTES
Chief Complaint   Follow-up probable lung cancer with prior history of low-grade lymphoma-hospice care    PROBLEM LIST   Patient Active Problem List   Diagnosis   • Allergic rhinitis   • Asthma   • COPD (chronic obstructive pulmonary disease) with emphysema   • Coronary artery disease   • Dyslipidemia   • GERD (gastroesophageal reflux disease)   • Hearing loss   • Hypothyroidism   • Non Hodgkin's lymphoma   • Pulmonary fibrosis   • History of bladder cancer   • CVA (cerebral vascular accident)       HISTORY OF PRESENT ILLNESS:   Patient and her wonderful daughter have been very happy with the care provided by hospice.  Morphine that I prescribed for dyspnea has helped substantially.  Breathing treatments have helped a lot.    She has rapidly grown weaker.  She's not getting up out of the couch or bed very much at this point.  She wanted to make this appointment so we can say our goodbyes.  She has very little in the way of discomfort.    Past Medical History, Past Surgical History, Social History, Family History have been reviewed and are without significant changes except as mentioned.      Medications:      Current Outpatient Prescriptions:   •  acetaminophen (TYLENOL) 500 MG tablet, Take 500 mg by mouth Daily As Needed for Mild Pain ., Disp: , Rfl:   •  ALPRAZolam (XANAX) 0.25 MG tablet, Take 1 tablet by mouth 2 (Two) Times a Day As Needed for Anxiety., Disp: 60 tablet, Rfl: 5  •  aspirin 81 MG chewable tablet, Chew 81 mg Daily., Disp: , Rfl:   •  atenolol (TENORMIN) 25 MG tablet, Take 25 mg by mouth Daily., Disp: , Rfl:   •  atorvastatin (LIPITOR) 40 MG tablet, Take 40 mg by mouth Daily., Disp: , Rfl:   •  calcium carbonate EX (TUMS EX) 750 MG chewable tablet, Chew 750 mg Daily As Needed for Indigestion or Heartburn., Disp: , Rfl:   •  clopidogrel (PLAVIX) 75 MG tablet, Take 1 tablet by mouth Daily., Disp: 30 tablet, Rfl: 1  •  ezetimibe (ZETIA) 10 MG tablet, Take 10 mg by mouth Daily., Disp: , Rfl:   •   "furosemide (LASIX) 40 MG tablet, Take 40 mg by mouth Daily As Needed., Disp: , Rfl:   •  isosorbide mononitrate (IMDUR) 30 MG 24 hr tablet, Take 30 mg by mouth Daily., Disp: , Rfl:   •  levothyroxine (SYNTHROID, LEVOTHROID) 50 MCG tablet, Take 50 mcg by mouth Daily., Disp: , Rfl:   •  Loratadine (CLARITIN) 10 MG capsule, Take 10 mg by mouth Every Morning., Disp: , Rfl:   •  montelukast (SINGULAIR) 10 MG tablet, Take 1 tablet by mouth Every Night., Disp: 30 tablet, Rfl: 3  •  morphine 100 MG/5ML solution concentrated solution, TAKE 2.5-5MG EVERY 2 HOURS PRN SOB, Disp: 180 mL, Rfl: 0  •  nitroglycerin (NITROSTAT) 0.4 MG SL tablet, Place 0.4 mg under the tongue Every 5 (Five) Minutes As Needed for Chest Pain., Disp: , Rfl:     ALLERGIES:    Allergies   Allergen Reactions   • Budesonide    • Formoterol    • Penicillins    • Sulfa Antibiotics        ROS:  Review of Systems   Constitutional: Negative for activity change and appetite change.        Very fatigued-progressive   HENT: Negative for mouth sores, sinus pressure and voice change.    Eyes: Negative for visual disturbance.   Respiratory: Negative for shortness of breath.         Dyspnea on exertion present   Cardiovascular: Negative for chest pain.   Gastrointestinal: Negative for abdominal pain and vomiting.        No constipation from morphine   Genitourinary: Negative for dysuria.   Musculoskeletal: Negative for arthralgias and myalgias.        Arthritic discomfort at at baseline   Skin: Negative for color change.   Neurological: Negative for dizziness, syncope and headaches.   Hematological: Negative for adenopathy.   Psychiatric/Behavioral: Negative for confusion, sleep disturbance and suicidal ideas. The patient is not nervous/anxious.            Objective:    /64 Comment: RUE  Pulse 78   Temp 97.9 °F (36.6 °C) (Temporal Artery )   Resp 24   Ht 149.9 cm (59\")   Wt 58.5 kg (129 lb)   SpO2 93% Comment: 6L o2  BMI 26.05 kg/m²     Physical Exam "   Constitutional: She is oriented to person, place, and time. She appears well-developed and well-nourished. No distress.   Very sweet 85-year-old lady who smiles readily.  She looks weak as a kitten.  However she appears comfortable   HENT:   Head: Normocephalic.   Mouth/Throat: Oropharynx is clear and moist.   Eyes: Conjunctivae and EOM are normal. No scleral icterus.   Neck: Normal range of motion. Neck supple. No thyromegaly present.   Cardiovascular: Normal rate, regular rhythm and normal heart sounds.    No murmur heard.  Pulmonary/Chest: Effort normal and breath sounds normal. She has no wheezes. She has no rales.   No wheezing.  Breath sounds decreased in both bases.   Abdominal: Soft. Bowel sounds are normal. She exhibits no distension and no mass. There is no tenderness.   Musculoskeletal: She exhibits no edema or tenderness.   Lymphadenopathy:     She has no cervical adenopathy.   Neurological: She is alert and oriented to person, place, and time. She displays normal reflexes. No cranial nerve deficit.   Skin: Skin is warm and dry. No rash noted.   Psychiatric: She has a normal mood and affect. Judgment normal.   Vitals reviewed.             RECENT LABS:  Hematology WBC   Date Value Ref Range Status   03/22/2018 8.80 3.50 - 10.80 10*3/mm3 Final     Hemoglobin   Date Value Ref Range Status   03/22/2018 10.0 (L) 11.5 - 15.5 g/dL Final     Hematocrit   Date Value Ref Range Status   03/22/2018 31.9 (L) 34.5 - 44.0 % Final     MCV   Date Value Ref Range Status   03/22/2018 81.1 80.0 - 99.0 fL Final     RDW   Date Value Ref Range Status   03/22/2018 17.7 (H) 11.3 - 14.5 % Final     MPV   Date Value Ref Range Status   03/22/2018 8.1 6.0 - 12.0 fL Final     Platelets   Date Value Ref Range Status   03/22/2018 229 150 - 450 10*3/mm3 Final     Immature Grans %   Date Value Ref Range Status   01/25/2018 0.1 0.0 - 0.6 % Final     Neutrophils, Absolute   Date Value Ref Range Status   03/22/2018 6.30 1.50 - 8.30  10*3/mm3 Final     Lymphocytes, Absolute   Date Value Ref Range Status   03/22/2018 1.80 0.60 - 4.80 10*3/mm3 Final     Monocytes, Absolute   Date Value Ref Range Status   03/22/2018 0.70 0.00 - 1.00 10*3/mm3 Final     Eosinophils, Absolute   Date Value Ref Range Status   01/25/2018 0.05 0.00 - 0.30 10*3/mm3 Final     Eosinophils Absolute   Date Value Ref Range Status   01/27/2018 0.36 (H) 0.10 - 0.30 10*3/mm3 Final     Basophils, Absolute   Date Value Ref Range Status   01/25/2018 0.03 0.00 - 0.20 10*3/mm3 Final     Basophils Absolute   Date Value Ref Range Status   01/27/2018 0.09 0.00 - 0.20 10*3/mm3 Final     Immature Grans, Absolute   Date Value Ref Range Status   01/25/2018 0.01 0.00 - 0.03 10*3/mm3 Final       Glucose   Date Value Ref Range Status   02/21/2018 92 70 - 100 mg/dL Final     Sodium   Date Value Ref Range Status   02/21/2018 139 132 - 146 mmol/L Final     Potassium   Date Value Ref Range Status   02/21/2018 3.6 3.5 - 5.5 mmol/L Final     CO2   Date Value Ref Range Status   02/21/2018 25.0 20.0 - 31.0 mmol/L Final     Chloride   Date Value Ref Range Status   02/21/2018 102 99 - 109 mmol/L Final     Anion Gap   Date Value Ref Range Status   02/21/2018 12.0 (H) 3.0 - 11.0 mmol/L Final     Creatinine   Date Value Ref Range Status   02/21/2018 0.80 0.60 - 1.30 mg/dL Final     BUN   Date Value Ref Range Status   02/21/2018 13 9 - 23 mg/dL Final     BUN/Creatinine Ratio   Date Value Ref Range Status   02/21/2018 16.3 7.0 - 25.0 Final     Calcium   Date Value Ref Range Status   02/21/2018 9.3 8.7 - 10.4 mg/dL Final     eGFR Non  Amer   Date Value Ref Range Status   02/21/2018 68 >60 mL/min/1.73 Final     Alkaline Phosphatase   Date Value Ref Range Status   02/21/2018 111 (H) 25 - 100 U/L Final     Total Protein   Date Value Ref Range Status   02/21/2018 7.6 5.7 - 8.2 g/dL Final     ALT (SGPT)   Date Value Ref Range Status   02/21/2018 11 7 - 40 U/L Final     AST (SGOT)   Date Value Ref Range Status    02/21/2018 29 0 - 33 U/L Final     Total Bilirubin   Date Value Ref Range Status   02/21/2018 0.8 0.3 - 1.2 mg/dL Final     Albumin   Date Value Ref Range Status   02/21/2018 4.20 3.20 - 4.80 g/dL Final     Globulin   Date Value Ref Range Status   02/21/2018 3.4 gm/dL Final     A/G Ratio   Date Value Ref Range Status   02/21/2018 1.2 (L) 1.5 - 2.5 g/dL Final          Perf Status: 2    Assessment/Plan    Diagnoses and all orders for this visit:    Follicular lymphoma grade I, unspecified body region      This wonderful woman is declining rather rapidly, not surprisingly.  I told her that she would continue to grow weaker and she was spend more time in the bed.  I think her passing will be comfortable.    I will call and talk with her daughter Nirmala next week.      I will very much miss this wonderful woman who I have  known for almost 20 years      Car Horton MD , 3/22/2018    CC:

## 2018-03-23 NOTE — TELEPHONE ENCOUNTER
"Hermelinda asking if patient was getting ready to pass away.  I went back to Dr. Horton's 3/22/18 note and read to Hermelinda from his note: She has rapidly grown weaker.  She's not getting up out of the couch or bed very much at this point.  She wanted to make this appointment so we can say our goodbyes.  She has very little in the way of discomfort.\"    \"This wonderful woman is declining rather rapidly, not surprisingly.  I told her that she would continue to grow weaker and she was spend more time in the bed.  I think her passing will be comfortable.\"    Hermelinda stated understanding and thanked me and Dr. Horton.  "

## 2018-03-23 NOTE — TELEPHONE ENCOUNTER
----- Message from Vinnie Panchal sent at 3/23/2018  9:10 AM EDT -----  Regarding: Sol-Call  Contact: [phone]  Pt.'s daughter Hermelinda  called and she has some questions after yesterday's visit

## 2018-07-27 NOTE — PROGRESS NOTES
[facility] Pulmonary Follow up    CHIEF COMPLAINT    Fu COPD    HISTORY OF PRESENT ILLNESS    Bee Santos is a 84 y.o.female here today for FU. She was last in the office and saw me 10/19/16. She has a history noted below.    smoked one pack per day for over 40 years and quit in 1997. She also has a history of early interstitial pulmonary fibrosis that was discovered on a CAT scan of the chest performed in 2013 initially presented with a nonproductive cough and mild dyspnea.     This resolved on Alvesco which we have been able to discontinue. She has not been using her albuterol inhaler. She developed heartburn on Spiriva and as such it was discontinued.  She doesn't really think any of the inhalers have helped.  Currently she is not really coughing that much, she denies much in way of wheezing.    She does have allergic rhinitis and is using Singulair each night and Claritin daily. She had a pulmonary function test performed which in 2015, demonstrated reduced diffusion capacity with normal spirometry and lung volumes.      She has been on continuous oxygen therapy at 2 L, for 24 hours. She is doing well the oxy-g0 as the battery last 9 hours..    She had one episode of pneumonia apparently in January where she had an x-ray, her PCP treated her and a subsequent x-ray showed clearing of the pneumonia by their report.  Her other hospitalization was for angina and [facility] in February, medications have been adjusted and this has been resolved, she has no chest pains.    She is followed by oncology with Dr. Han regularly with serial CT scans, and lab work.  She is a history of non-Hodgkin's lymphoma.  She's done well, with no decrease in her weight, she has a good appetite.  Most recent CT the abdomen and pelvis was stable, compared to 2015, the scan was at [facility] on 10/12/16.    Patient Active Problem List   Diagnosis   • Allergic rhinitis   • Asthma   • COPD (chronic obstructive pulmonary  NURSING ADMISSION NOTE      Patient admitted via Wheelchair. Mom at [facility] ,Oriented to room and unit  Safety precautions initiated. Bed in low position. Call light in reach. disease) with emphysema   • Coronary artery disease   • Dyslipidemia   • GERD (gastroesophageal reflux disease)   • Hearing loss   • Hypothyroidism   • Non Hodgkin's lymphoma   • Pulmonary fibrosis   • History of bladder cancer       Allergies   Allergen Reactions   • Budesonide    • Formoterol    • Penicillins    • Sulfa Antibiotics        Current Outpatient Prescriptions:   •  acetaminophen (TYLENOL) 325 MG tablet, Take  by mouth., Disp: , Rfl:   •  aspirin 81 MG chewable tablet, Chew Daily., Disp: , Rfl:   •  atenolol (TENORMIN) 25 MG tablet, Take  by mouth Daily., Disp: , Rfl:   •  atorvastatin (LIPITOR) 40 MG tablet, Take  by mouth Daily., Disp: , Rfl:   Patient Sig: Alvesco 160 MCG/ACT Inhalation Aerosol Solution QD; 0; 23-Sep-2014; Active, Disp: , Rfl:   •  ezetimibe (ZETIA) 10 MG tablet, Take  by mouth Daily., Disp: , Rfl:   •  furosemide (LASIX) 40 MG tablet, Take  by mouth once a month  •  levothyroxine (SYNTHROID, LEVOTHROID) 75 MCG tablet, Take  by mouth Daily., Disp: , Rfl:   •  Loratadine (CLARITIN) 10 MG capsule, Take  by mouth., Disp: , Rfl:   •  montelukast (SINGULAIR) 10 MG tablet, Take 1 tablet by mouth Every Night., Disp: 30 tablet, Rfl: 3  •  nitroglycerin (NITROSTAT) 0.4 MG SL tablet, Place  under the tongue., Disp: , Rfl:   •  O2 (OXYGEN), Oxygen; Patient Sig: Oxygen ; 0; 13-Apr-2016; Active, Disp: , Rfl:   MEDICATION LIST AND ALLERGIES REVIEWED.    Social History   Substance Use Topics   • Smoking status: Former Smoker     Packs/day: 1.00     Years: 40.00     Types: Cigarettes     Quit date: 1997   • Smokeless tobacco: Never Used   • Alcohol use No       FAMILY AND SOCIAL HISTORY REVIEWED.    Review of Systems   Constitutional: Negative for activity change, chills, fatigue and fever.   HENT: Positive for postnasal drip. Negative for hearing loss, nosebleeds and sneezing.    Respiratory: Positive for cough and shortness of breath. Negative for apnea, chest tightness, wheezing and stridor.   "  Cardiovascular: Positive for leg swelling. Negative for chest pain and palpitations.        NO INCREASE IN MILD BLE EDEMA; NO CALF TENDERNESS    Gastrointestinal: Negative for abdominal pain, diarrhea and nausea.   Neurological: Negative for dizziness, syncope and light-headedness.   .    /81  Pulse 74  Temp 97.1 °F (36.2 °C)  Resp 18  Ht 59\" (149.9 cm)  Wt 144 lb 12.8 oz (65.7 kg)  SpO2 (!) 88%  PF (!) 2 L/min  BMI 29.25 kg/m2  Physical Exam   Constitutional: She is oriented to person, place, and time. She appears well-developed. No distress.   HENT:   Head: Normocephalic.   Eyes: Pupils are equal, round, and reactive to light.   Neck: No JVD present. No thyromegaly present.   Cardiovascular: Normal rate and regular rhythm.  Exam reveals no friction rub.    Murmur heard.  Mild BLE edema; no venous cords or calf tenderness.    Pulmonary/Chest: Effort normal. No stridor. No respiratory distress. She has no wheezes. She has rales. She exhibits no tenderness.   L>R coarse rales lower to mid lung zones   Lymphadenopathy:     She has no cervical adenopathy.   Neurological: She is alert and oriented to person, place, and time.   Skin: She is not diaphoretic.   Psychiatric: She has a normal mood and affect. Her behavior is normal. Thought content normal.       RESULTS    Pulmonary function tests show no restriction with a total lung capacity similar to thousand 15 at 2.82 L, 81%.  Diffusion however his markedly reduced at 4.4, 29% adjusting her weight is 66%.  There is no obstruction with a ratio of 84%, FVC is 104%, FEV1 121        PROBLEM LIST    Problem List Items Addressed This Visit        Cardiovascular and Mediastinum    Coronary artery disease       Respiratory    COPD (chronic obstructive pulmonary disease) with emphysema    Relevant Medications    montelukast (SINGULAIR) 10 MG tablet    Pulmonary fibrosis    Overview     CT scan of the chest 02/23/2015 reports findings consistent with interstitial " lung      disease, prior CT of 02/14/2013 reports probable early interstitial lung      disease/pulmonary fibrosis.               Relevant Medications    montelukast (SINGULAIR) 10 MG tablet       Digestive    GERD (gastroesophageal reflux disease)       Hematopoietic and Hemostatic    Non Hodgkin's lymphoma    Overview     Low-grade lymphoma, status post prior chemotherapy with Treanda.                 Other Visit Diagnoses     SOB (shortness of breath)    -  Primary    Relevant Orders    Comprehensive Metabolic Panel (Completed)    CBC & Differential (Completed)    BNP (Completed)    CBC Auto Differential (Completed)            DISCUSSION    I'll obtain the x-ray report from her PCPs office    We allan labs today to check for anemia given the decreased DLCO    They will call us in the morning for the results    Follow-up in 6 months, earlier if needed    Continue the oxygen and she has been      Maricruz Santamaria, APRN  04/11/201711:37 AM  Electronically signed     Please note that portions of this note were completed with a voice recognition program. Efforts were made to edit the dictations, but occasionally words are mistranscribed.      CC: Gracie Harmon MD